# Patient Record
Sex: MALE | Race: WHITE | NOT HISPANIC OR LATINO | Employment: OTHER | URBAN - METROPOLITAN AREA
[De-identification: names, ages, dates, MRNs, and addresses within clinical notes are randomized per-mention and may not be internally consistent; named-entity substitution may affect disease eponyms.]

---

## 2017-01-23 RX ORDER — DILTIAZEM HYDROCHLORIDE 60 MG/1
TABLET, FILM COATED ORAL
Qty: 10.2 G | Refills: 2 | Status: SHIPPED | OUTPATIENT
Start: 2017-01-23 | End: 2017-08-01 | Stop reason: SDUPTHER

## 2017-01-25 ENCOUNTER — TELEPHONE (OUTPATIENT)
Dept: INTERNAL MEDICINE | Facility: CLINIC | Age: 37
End: 2017-01-25

## 2017-01-25 NOTE — TELEPHONE ENCOUNTER
----- Message from Bonnie Pierce sent at 1/25/2017  9:48 AM EST -----  Contact: Patient  Patient was prescribed Symbicort last year, and our office gave him a coupon for this med.  Do we have any more coupons?  Please advise.     Patient:  [phone]

## 2017-01-25 NOTE — TELEPHONE ENCOUNTER
Will you check and let patient know what coupons we have? Also, he can always check online as well ([email or web address]). Thanks.

## 2017-02-22 RX ORDER — MONTELUKAST SODIUM 10 MG/1
TABLET ORAL
Qty: 30 TABLET | Refills: 11 | Status: SHIPPED | OUTPATIENT
Start: 2017-02-22 | End: 2018-06-21 | Stop reason: SDUPTHER

## 2017-03-06 ENCOUNTER — OFFICE VISIT (OUTPATIENT)
Dept: INTERNAL MEDICINE | Facility: CLINIC | Age: 37
End: 2017-03-06

## 2017-03-06 VITALS
DIASTOLIC BLOOD PRESSURE: 70 MMHG | BODY MASS INDEX: 23.99 KG/M2 | WEIGHT: 181 LBS | HEIGHT: 73 IN | HEART RATE: 66 BPM | SYSTOLIC BLOOD PRESSURE: 108 MMHG

## 2017-03-06 DIAGNOSIS — J45.909 UNCOMPLICATED ASTHMA, UNSPECIFIED ASTHMA SEVERITY: ICD-10-CM

## 2017-03-06 DIAGNOSIS — E78.00 HYPERCHOLESTEREMIA: Primary | ICD-10-CM

## 2017-03-06 LAB
ALBUMIN SERPL-MCNC: 4.5 G/DL (ref 3.5–5.2)
ALBUMIN/GLOB SERPL: 1.8 G/DL
ALP SERPL-CCNC: 42 U/L (ref 39–117)
ALT SERPL-CCNC: 25 U/L (ref 1–41)
AST SERPL-CCNC: 22 U/L (ref 1–40)
BILIRUB SERPL-MCNC: 0.3 MG/DL (ref 0.1–1.2)
BUN SERPL-MCNC: 14 MG/DL (ref 6–20)
BUN/CREAT SERPL: 16.7 (ref 7–25)
CALCIUM SERPL-MCNC: 9.5 MG/DL (ref 8.6–10.5)
CHLORIDE SERPL-SCNC: 100 MMOL/L (ref 98–107)
CHOLEST SERPL-MCNC: 221 MG/DL (ref 0–200)
CO2 SERPL-SCNC: 27 MMOL/L (ref 22–29)
CREAT SERPL-MCNC: 0.84 MG/DL (ref 0.76–1.27)
GLOBULIN SER CALC-MCNC: 2.5 GM/DL
GLUCOSE SERPL-MCNC: 100 MG/DL (ref 65–99)
HDLC SERPL-MCNC: 52 MG/DL (ref 40–60)
LDLC SERPL CALC-MCNC: 144 MG/DL (ref 0–100)
POTASSIUM SERPL-SCNC: 4.3 MMOL/L (ref 3.5–5.2)
PROT SERPL-MCNC: 7 G/DL (ref 6–8.5)
SODIUM SERPL-SCNC: 142 MMOL/L (ref 136–145)
TRIGL SERPL-MCNC: 123 MG/DL (ref 0–150)
VLDLC SERPL-MCNC: 24.6 MG/DL (ref 5–40)

## 2017-03-06 PROCEDURE — 99214 OFFICE O/P EST MOD 30 MIN: CPT | Performed by: NURSE PRACTITIONER

## 2017-03-07 NOTE — PROGRESS NOTES
Subjective   Rush Ruiz is a 36 y.o. male who presents for f/u regarding hyperlipidemia and asthma.    HPI Comments: He has been resistant to the beginning statins despite strong family hx CAD, Dad MI at age 38.  He has a history of asthma which has been well-controlled on Symbicort which is does not use daily. He rarely uses his Albuterol inhaler.     Hyperlipidemia   This is a chronic problem. The current episode started more than 1 year ago. The problem is uncontrolled. Recent lipid tests were reviewed and are high. He has no history of diabetes. Pertinent negatives include no chest pain, leg pain, myalgias or shortness of breath. Current antihyperlipidemic treatment includes herbal therapy (started Cholestoff in December 2016).        The following portions of the patient's history were reviewed and updated as appropriate: allergies, current medications, past social history and problem list.    Past Medical History   Diagnosis Date   • Allergic rhinitis    • Anxiety    • Asthma    • Atresia of external auditory canal 1978     Left Ear   • Hepatitis C antibody test positive    • History of retinopathy    • Hyperlipidemia    • Psoriasis      Dr. Milton Pineda         Current Outpatient Prescriptions:   •  albuterol (PROVENTIL HFA;VENTOLIN HFA) 108 (90 BASE) MCG/ACT inhaler, Inhale 2 puffs every 4 (four) hours as needed for wheezing., Disp: , Rfl:   •  loratadine (CLARITIN) 10 MG tablet, Take 10 mg by mouth daily., Disp: , Rfl:   •  montelukast (SINGULAIR) 10 MG tablet, TAKE ONE TABLET BY MOUTH DAILY, Disp: 30 tablet, Rfl: 11  •  SYMBICORT 80-4.5 MCG/ACT inhaler, INHALE TWO PUFFS BY MOUTH TWICE A DAY. RINSE AND SPIT AFTER USE., Disp: 10.2 g, Rfl: 2    No Known Allergies    Review of Systems   Constitutional: Negative for chills, fatigue, fever and unexpected weight change.   HENT: Negative for congestion, ear pain, postnasal drip, sinus pressure, sore throat and trouble swallowing.    Eyes: Negative for  "visual disturbance.   Respiratory: Negative for cough, chest tightness, shortness of breath and wheezing.    Cardiovascular: Negative for chest pain, palpitations and leg swelling.   Gastrointestinal: Negative for abdominal pain, blood in stool, nausea and vomiting.   Endocrine: Negative for cold intolerance and heat intolerance.   Genitourinary: Negative for dysuria, frequency and urgency.   Musculoskeletal: Negative for arthralgias, joint swelling and myalgias.   Skin: Negative for color change.   Allergic/Immunologic: Negative for immunocompromised state.   Neurological: Negative for syncope, weakness and headaches.   Hematological: Does not bruise/bleed easily.       Objective   Vitals:    03/06/17 0813   BP: 108/70   Pulse: 66   Weight: 181 lb (82.1 kg)   Height: 73\" (185.4 cm)     Physical Exam   Constitutional: He is oriented to person, place, and time. He appears well-developed and well-nourished. No distress.   HENT:   Head: Normocephalic and atraumatic.   Right Ear: External ear normal.   Eyes: Conjunctivae and EOM are normal. Pupils are equal, round, and reactive to light. No scleral icterus.   Neck: Normal range of motion. Neck supple. No thyromegaly present.   Cardiovascular: Normal rate, regular rhythm, normal heart sounds and intact distal pulses.  Exam reveals no gallop and no friction rub.    No murmur heard.  Pulmonary/Chest: Effort normal and breath sounds normal. No respiratory distress.   Lymphadenopathy:     He has no cervical adenopathy.   Neurological: He is alert and oriented to person, place, and time.   Skin: Skin is warm and dry. No rash noted. No erythema.   Psychiatric: He has a normal mood and affect. His behavior is normal.   Nursing note and vitals reviewed.      Assessment/Plan   Rush was seen today for follow-up.    Diagnoses and all orders for this visit:    Hypercholesteremia  Comments:  recheck lipids today, would like to take Red Yeast Rice if still elevated instead of statin " therapy  Orders:  -     Lipid Panel; Future  -     Comprehensive Metabolic Panel; Future  -     Lipid Panel  -     Comprehensive Metabolic Panel    Uncomplicated asthma, unspecified asthma severity  Comments:  doing well with current meds

## 2017-04-03 ENCOUNTER — TELEPHONE (OUTPATIENT)
Dept: INTERNAL MEDICINE | Facility: CLINIC | Age: 37
End: 2017-04-03

## 2017-04-03 NOTE — TELEPHONE ENCOUNTER
----- Message from Whitney Valdez MA sent at 4/3/2017 12:29 PM EDT -----  Pt calling for referal to Urology for possible Vasectomy    Pt#[phone]

## 2017-04-04 DIAGNOSIS — Z30.09 ENCOUNTER FOR EVALUATION REGARDING CONTRACEPTION OPTIONS: Primary | ICD-10-CM

## 2017-04-14 ENCOUNTER — TELEPHONE (OUTPATIENT)
Dept: INTERNAL MEDICINE | Facility: CLINIC | Age: 37
End: 2017-04-14

## 2017-04-14 NOTE — TELEPHONE ENCOUNTER
----- Message from Brii Esqueda sent at 4/14/2017 11:49 AM EDT -----  Contact: pt - Dr Camarillo' pt - RE: new Rx refill  Pt calling and would like a refill on a Rx for Xanax. Pt informs has to fly out of town and would like Xanax to calm nerves on plane. Pt informs was given Rx for same reason about 1 1/2 yrs ago. Could you please call Rx to pt's pharmacy? Please advise. Thanks      [facility] [address] AT [address] [place] [address] - [phone]  - [phone] (Phone)  [phone] (Fax)    Pt # [phone]

## 2017-05-24 ENCOUNTER — TELEPHONE (OUTPATIENT)
Dept: INTERNAL MEDICINE | Facility: CLINIC | Age: 37
End: 2017-05-24

## 2017-06-01 ENCOUNTER — TELEPHONE (OUTPATIENT)
Dept: INTERNAL MEDICINE | Facility: CLINIC | Age: 37
End: 2017-06-01

## 2017-06-01 NOTE — TELEPHONE ENCOUNTER
----- Message from Bonnie Pierce sent at 6/1/2017 10:30 AM EDT -----  Contact: Patient  Patient called stating he missed a call from a few days ago.  Please return his call when you can.    Patient:  [phone]    Call before 11 AM today or tomorrow please.  Thanks.

## 2017-06-02 ENCOUNTER — TELEPHONE (OUTPATIENT)
Dept: INTERNAL MEDICINE | Facility: CLINIC | Age: 37
End: 2017-06-02

## 2017-06-02 NOTE — TELEPHONE ENCOUNTER
Transferred call from     Pt calling stating he would like a C reactive protein and lipo profile due to having family HX of heart disease. He spoke with his insurance and they stated to use code of Z82.49, which is family hx of cardiovascular disease. He does need appeal letter stating he should have test done or not, if you feel strongly about him having this done. Please advise thanks

## 2017-06-29 NOTE — TELEPHONE ENCOUNTER
Pt calling back asking your advise as if he can get the C reactive protein and lipo labs done, please advise thanks.

## 2017-07-11 ENCOUNTER — OFFICE VISIT (OUTPATIENT)
Dept: INTERNAL MEDICINE | Facility: CLINIC | Age: 37
End: 2017-07-11

## 2017-07-11 VITALS
OXYGEN SATURATION: 98 % | DIASTOLIC BLOOD PRESSURE: 70 MMHG | HEART RATE: 50 BPM | TEMPERATURE: 98.3 F | SYSTOLIC BLOOD PRESSURE: 104 MMHG | BODY MASS INDEX: 23.09 KG/M2 | WEIGHT: 175 LBS

## 2017-07-11 DIAGNOSIS — J02.9 ACUTE PHARYNGITIS, UNSPECIFIED ETIOLOGY: Primary | ICD-10-CM

## 2017-07-11 LAB
EXPIRATION DATE: NORMAL
INTERNAL CONTROL: NORMAL
Lab: NORMAL
S PYO AG THROAT QL: NEGATIVE

## 2017-07-11 PROCEDURE — 87880 STREP A ASSAY W/OPTIC: CPT | Performed by: NURSE PRACTITIONER

## 2017-07-11 PROCEDURE — 99213 OFFICE O/P EST LOW 20 MIN: CPT | Performed by: NURSE PRACTITIONER

## 2017-07-11 RX ORDER — CETIRIZINE HYDROCHLORIDE 10 MG/1
10 TABLET ORAL DAILY
Qty: 7 TABLET | Refills: 0
Start: 2017-07-11 | End: 2017-07-18

## 2017-07-11 RX ORDER — GUAIFENESIN 600 MG/1
600 TABLET, EXTENDED RELEASE ORAL 2 TIMES DAILY
Qty: 14 TABLET | Refills: 0
Start: 2017-07-11 | End: 2017-07-18

## 2017-07-11 NOTE — PROGRESS NOTES
Subjective   Rush Ruiz is a 36 y.o. male.     Sore Throat    This is a new problem. The current episode started 1 to 4 weeks ago. The problem has been gradually worsening. The pain is worse on the left side. There has been no fever. The pain is at a severity of 2/10 (dull, worst 6/10). The pain is mild. Associated symptoms include coughing (dry ), a hoarse voice and swollen glands ( about 1 weeks ago but resolved ). Pertinent negatives include no abdominal pain, congestion, diarrhea, ear discharge, ear pain, headaches, plugged ear sensation, neck pain, shortness of breath, trouble swallowing or vomiting. He has had no exposure to strep or mono. Treatments tried: throat lozenges  The treatment provided mild relief.        The following portions of the patient's history were reviewed and updated as appropriate: allergies, current medications, past family history and problem list.    Review of Systems   Constitutional: Negative for appetite change, chills, fatigue and fever.   HENT: Positive for hoarse voice, postnasal drip, sore throat and voice change. Negative for congestion, ear discharge, ear pain, facial swelling, hearing loss, rhinorrhea, sinus pressure, sneezing, tinnitus and trouble swallowing.    Respiratory: Positive for cough (dry ). Negative for chest tightness, shortness of breath and wheezing.    Cardiovascular: Negative for chest pain.   Gastrointestinal: Negative for abdominal pain, diarrhea, nausea and vomiting.        He denies any reflux   Musculoskeletal: Negative for neck pain and neck stiffness.   Neurological: Negative for headaches.   Hematological: Negative for adenopathy.       Objective   Physical Exam   Constitutional: He appears well-developed and well-nourished. He is cooperative. He does not have a sickly appearance. He does not appear ill.   HENT:   Head: Normocephalic.   Right Ear: Hearing, tympanic membrane and external ear normal. No drainage, swelling or tenderness. No mastoid  tenderness. Tympanic membrane is not injected, not scarred, not erythematous and not bulging. Tympanic membrane mobility is normal. No middle ear effusion. No decreased hearing is noted.   Left Ear: Hearing normal.   Nose: Nose normal. No mucosal edema, rhinorrhea, sinus tenderness or nasal deformity. Right sinus exhibits no maxillary sinus tenderness and no frontal sinus tenderness. Left sinus exhibits no maxillary sinus tenderness and no frontal sinus tenderness.   Mouth/Throat: Mucous membranes are normal. Normal dentition. Posterior oropharyngeal erythema present. Tonsils are 3+ on the right. Tonsils are 3+ on the left. No tonsillar exudate.   No TM to left ear    Eyes: Conjunctivae and lids are normal. Pupils are equal, round, and reactive to light. Right eye exhibits no discharge and no exudate. Left eye exhibits no discharge and no exudate.   Neck: Trachea normal and normal range of motion. No edema present. No thyroid mass and no thyromegaly present.   Cardiovascular: Regular rhythm, normal heart sounds and normal pulses.    No murmur heard.  Pulmonary/Chest: Breath sounds normal. No respiratory distress. He has no decreased breath sounds. He has no wheezes. He has no rhonchi. He has no rales.   Lymphadenopathy:        Head (right side): No submental, no submandibular, no tonsillar, no preauricular, no posterior auricular and no occipital adenopathy present.        Head (left side): No submental, no submandibular, no tonsillar, no preauricular, no posterior auricular and no occipital adenopathy present.     He has no cervical adenopathy.   Neurological: He is alert.   Skin: Skin is warm, dry and intact. No cyanosis. Nails show no clubbing.       Assessment/Plan   Rush was seen today for sore throat.    Diagnoses and all orders for this visit:    Acute pharyngitis, unspecified etiology  Comments:  warm salt gargles; switch to allegra/zyrtec   Orders:  -     POC Rapid Strep A  -     guaiFENesin (MUCINEX) 600  MG 12 hr tablet; Take 1 tablet by mouth 2 (Two) Times a Day for 7 days.  -     cetirizine (zyrTEC) 10 MG tablet; Take 1 tablet by mouth Daily for 7 days.      Rapid strep negative.

## 2017-07-11 NOTE — PATIENT INSTRUCTIONS

## 2017-07-26 ENCOUNTER — TELEPHONE (OUTPATIENT)
Dept: INTERNAL MEDICINE | Facility: CLINIC | Age: 37
End: 2017-07-26

## 2017-08-01 ENCOUNTER — TELEPHONE (OUTPATIENT)
Dept: INTERNAL MEDICINE | Facility: CLINIC | Age: 37
End: 2017-08-01

## 2017-08-01 RX ORDER — DILTIAZEM HYDROCHLORIDE 60 MG/1
TABLET, FILM COATED ORAL
Qty: 10.2 G | Refills: 1 | Status: SHIPPED | OUTPATIENT
Start: 2017-08-01 | End: 2018-03-12 | Stop reason: SDUPTHER

## 2017-08-01 RX ORDER — ALBUTEROL SULFATE 90 UG/1
2 AEROSOL, METERED RESPIRATORY (INHALATION) EVERY 4 HOURS PRN
Qty: 1 INHALER | Refills: 3 | Status: SHIPPED | OUTPATIENT
Start: 2017-08-01

## 2017-08-01 NOTE — TELEPHONE ENCOUNTER
Pt calling back stating he needs to have testing done because of family history of cardiovascular disease. Does pt need to come in and schedule appt with you to be evaluated to have this testing done, please advise  Thanks.

## 2017-08-01 NOTE — TELEPHONE ENCOUNTER
----- Message from Vero Ferris sent at 8/1/2017  3:45 PM EDT -----  Contact: pt  Pt calling and would like a refill on RX sent into Pharmacy. Please advise.         albuterol (PROVENTIL HFA;VENTOLIN HFA) 108 (90 BASE) MCG/ACT inhaler       Send to :  [facility]    Pt# [phone]

## 2017-08-02 NOTE — TELEPHONE ENCOUNTER
Called pt and pt states that he has not mailed letter yet but he was concerned about them needing family history of information. He will send them letter you addressed and let us know if he needs anything else, please advise thanks.

## 2017-08-24 ENCOUNTER — TELEPHONE (OUTPATIENT)
Dept: INTERNAL MEDICINE | Facility: CLINIC | Age: 37
End: 2017-08-24

## 2017-08-24 DIAGNOSIS — R09.89 GLOBUS SENSATION: Primary | ICD-10-CM

## 2017-08-24 NOTE — TELEPHONE ENCOUNTER
----- Message from Brii Esqueda sent at 8/24/2017  8:33 AM EDT -----  Contact: pt - Dr Camarillo' pt - RE: referral  Pt calling and would like a return call regarding a referral for an ENT. Pt informs is having issues of sensation of something stick in throat. Pt was seen in office for throat flaring up and tested for strep. Could you please put referral in pt's chart. Please advise. Thanks      Pt # [phone]

## 2017-09-13 ENCOUNTER — OFFICE VISIT (OUTPATIENT)
Dept: INTERNAL MEDICINE | Facility: CLINIC | Age: 37
End: 2017-09-13

## 2017-09-13 VITALS
WEIGHT: 175 LBS | BODY MASS INDEX: 23.19 KG/M2 | DIASTOLIC BLOOD PRESSURE: 78 MMHG | HEIGHT: 73 IN | SYSTOLIC BLOOD PRESSURE: 112 MMHG

## 2017-09-13 DIAGNOSIS — J45.20 MILD INTERMITTENT ASTHMA WITHOUT COMPLICATION: ICD-10-CM

## 2017-09-13 DIAGNOSIS — E78.00 HYPERCHOLESTEREMIA: Primary | ICD-10-CM

## 2017-09-13 LAB
CHOLEST SERPL-MCNC: 219 MG/DL (ref 0–200)
HDLC SERPL-MCNC: 51 MG/DL (ref 40–60)
LDLC SERPL CALC-MCNC: 150 MG/DL (ref 0–100)
LDLC/HDLC SERPL: 2.94 {RATIO}
TRIGL SERPL-MCNC: 91 MG/DL (ref 0–150)
VLDLC SERPL-MCNC: 18.2 MG/DL (ref 5–40)

## 2017-09-13 PROCEDURE — 99212 OFFICE O/P EST SF 10 MIN: CPT

## 2017-09-13 PROCEDURE — 80061 LIPID PANEL: CPT

## 2017-09-13 NOTE — PROGRESS NOTES
Subjective   Rush Ruiz is a 36 y.o. male.     Hyperlipidemia   This is a chronic problem. The current episode started more than 1 year ago. The problem is uncontrolled. Recent lipid tests were reviewed and are high. He has no history of chronic renal disease or diabetes. There are no known factors aggravating his hyperlipidemia. Associated symptoms include shortness of breath (He has known asthma and uses Symbicort gen daily.). Pertinent negatives include no chest pain, focal sensory loss, focal weakness or leg pain. He is currently on no antihyperlipidemic treatment. There are no compliance problems.  Risk factors for coronary artery disease include male sex, family history and dyslipidemia.        The following portions of the patient's history were reviewed and updated as appropriate: allergies, current medications, past family history, past medical history, past social history, past surgical history and problem list.    Review of Systems   Constitutional: Negative for chills, fatigue, fever and unexpected weight change.   HENT: Negative for congestion, ear pain, postnasal drip, sinus pressure, sore throat and trouble swallowing.    Eyes: Negative for visual disturbance.   Respiratory: Positive for shortness of breath (He has known asthma and uses Symbicort gen daily.). Negative for cough, chest tightness and wheezing.    Cardiovascular: Negative for chest pain, palpitations and leg swelling.   Gastrointestinal: Negative for abdominal pain, blood in stool, nausea and vomiting.   Endocrine: Negative for cold intolerance and heat intolerance.   Genitourinary: Negative for dysuria, frequency and urgency.   Musculoskeletal: Negative for arthralgias and joint swelling.   Skin: Negative for color change.   Allergic/Immunologic: Negative for immunocompromised state.   Neurological: Negative for focal weakness, syncope, weakness and headaches.   Hematological: Does not bruise/bleed easily.       Objective   Physical  Exam   Constitutional: He is oriented to person, place, and time. He appears well-developed and well-nourished. No distress.   HENT:   Head: Normocephalic and atraumatic.   Eyes: Conjunctivae are normal.   Neck: Normal range of motion. Neck supple.   Cardiovascular: Normal rate, regular rhythm, normal heart sounds and intact distal pulses.  Exam reveals no gallop and no friction rub.    No murmur heard.  Pulmonary/Chest: Effort normal and breath sounds normal. No respiratory distress.   Lymphadenopathy:     He has no cervical adenopathy.   Neurological: He is alert and oriented to person, place, and time.   Skin: Skin is warm and dry. No rash noted. No erythema.   Psychiatric: He has a normal mood and affect. His behavior is normal.   Nursing note and vitals reviewed.      Assessment/Plan   Rush was seen today for hyperlipidemia and asthma.    Diagnoses and all orders for this visit:    Hypercholesteremia  -     Lipid Panel; Future    Mild intermittent asthma without complication    Patient is to continue on all meds, diet and activity.

## 2017-09-26 ENCOUNTER — TELEPHONE (OUTPATIENT)
Dept: INTERNAL MEDICINE | Facility: CLINIC | Age: 37
End: 2017-09-26

## 2017-09-26 NOTE — TELEPHONE ENCOUNTER
----- Message from Whitney Valdez MA sent at 9/26/2017  1:26 PM EDT -----  Pt calling and says his deductible has been met. Pt asks if his chart can be reviewed for any recommendations for screenings, immunizations etc..      Pt [phone]

## 2017-09-27 ENCOUNTER — TELEPHONE (OUTPATIENT)
Dept: INTERNAL MEDICINE | Facility: CLINIC | Age: 37
End: 2017-09-27

## 2017-09-27 DIAGNOSIS — Z13.6 SCREENING FOR CARDIOVASCULAR CONDITION: Primary | ICD-10-CM

## 2017-09-27 NOTE — TELEPHONE ENCOUNTER
----- Message from Kristin Jones sent at 9/27/2017 10:06 AM EDT -----  Pt would like referral to Cardiologist he has family hx of cardiovascular disease and has met his insurance deductible.  Kristin has suggested some testing but insurance will not approve. His father had his first heart attack at his age and he would like to see a cardiologist.  Please advise    Pt # [phone]

## 2017-10-10 ENCOUNTER — OFFICE VISIT (OUTPATIENT)
Dept: INTERNAL MEDICINE | Facility: CLINIC | Age: 37
End: 2017-10-10

## 2017-10-10 VITALS
WEIGHT: 179 LBS | DIASTOLIC BLOOD PRESSURE: 72 MMHG | HEART RATE: 68 BPM | BODY MASS INDEX: 23.72 KG/M2 | HEIGHT: 73 IN | OXYGEN SATURATION: 97 % | SYSTOLIC BLOOD PRESSURE: 120 MMHG

## 2017-10-10 DIAGNOSIS — J45.20 MILD INTERMITTENT ASTHMA WITHOUT COMPLICATION: Primary | ICD-10-CM

## 2017-10-10 DIAGNOSIS — E78.00 HYPERCHOLESTEREMIA: ICD-10-CM

## 2017-10-10 PROCEDURE — 99213 OFFICE O/P EST LOW 20 MIN: CPT | Performed by: NURSE PRACTITIONER

## 2017-10-11 NOTE — PROGRESS NOTES
Subjective   Rush Ruiz is a 37 y.o. male who presents for f/u regarding asthma and hypercholesteremia.    HPI Comments: He does well with Symbicort and Singulair for management of asthma, denies wheezing or chest tightness. Uses ProAir very rarely.  He has made an appt with Dr. Hopson for further evaluation and consult re: family hx heart disease. He has a longstanding hx of elevated cholesterol but has very reluctant to begin statin therapy (has taken Red yeast rice and other OTC supplements without improvement in levels), currently practices a vegetarian diet.  He is scheduled for 5 year repeat colonoscopy with Dr. Ruiz.      Hyperlipidemia   This is a chronic problem. The current episode started more than 1 year ago. The problem is uncontrolled. Recent lipid tests were reviewed and are high. He has no history of diabetes or hypothyroidism. There are no known factors aggravating his hyperlipidemia. Pertinent negatives include no chest pain, focal weakness or myalgias. He is currently on no antihyperlipidemic treatment. The current treatment provides no improvement of lipids.        The following portions of the patient's history were reviewed and updated as appropriate: allergies, current medications, past social history and problem list.    Past Medical History:   Diagnosis Date   • Allergic rhinitis    • Anxiety    • Asthma    • Atresia of external auditory canal 1978    Left Ear   • Hepatitis C antibody test positive    • History of retinopathy    • Hyperlipidemia    • Psoriasis     Dr. Milton Pineda         Current Outpatient Prescriptions:   •  albuterol (PROVENTIL HFA;VENTOLIN HFA) 108 (90 BASE) MCG/ACT inhaler, Inhale 2 puffs Every 4 (Four) Hours As Needed for Wheezing., Disp: 1 inhaler, Rfl: 3  •  montelukast (SINGULAIR) 10 MG tablet, TAKE ONE TABLET BY MOUTH DAILY, Disp: 30 tablet, Rfl: 11  •  SYMBICORT 80-4.5 MCG/ACT inhaler, INHALE TWO PUFFS BY MOUTH TWICE A DAY. RINSE AND SPIT AFTER USE.,  "Disp: 10.2 g, Rfl: 1    No Known Allergies    Review of Systems   Constitutional: Negative for chills, fatigue, fever and unexpected weight change.   HENT: Positive for congestion and postnasal drip. Negative for ear pain, sinus pressure, sore throat and trouble swallowing.    Eyes: Negative for visual disturbance.   Respiratory: Negative for cough, chest tightness and wheezing.    Cardiovascular: Negative for chest pain, palpitations and leg swelling.   Gastrointestinal: Negative for abdominal pain, blood in stool, nausea and vomiting.   Endocrine: Negative for cold intolerance and heat intolerance.   Genitourinary: Negative for dysuria, frequency and urgency.   Musculoskeletal: Negative for arthralgias, joint swelling and myalgias.   Skin: Negative for color change.   Allergic/Immunologic: Negative for immunocompromised state.   Neurological: Negative for focal weakness, syncope, weakness and headaches.   Hematological: Does not bruise/bleed easily.       Objective   Vitals:    10/10/17 0947   BP: 120/72   BP Location: Left arm   Patient Position: Sitting   Cuff Size: Adult   Pulse: 68   SpO2: 97%   Weight: 179 lb (81.2 kg)   Height: 73\" (185.4 cm)     Physical Exam   Constitutional: He is oriented to person, place, and time. He appears well-developed and well-nourished. No distress.   HENT:   Head: Normocephalic and atraumatic.   Right Ear: External ear normal.   Atresia left external ear canal   Eyes: Conjunctivae are normal.   Neck: Normal range of motion. Neck supple.   Cardiovascular: Normal rate, regular rhythm, normal heart sounds and intact distal pulses.  Exam reveals no gallop and no friction rub.    No murmur heard.  Pulmonary/Chest: Effort normal and breath sounds normal. No respiratory distress.   Lymphadenopathy:     He has no cervical adenopathy.   Neurological: He is alert and oriented to person, place, and time.   Skin: Skin is warm and dry. No rash noted. No erythema.   Psychiatric: He has a " normal mood and affect. His behavior is normal.   Nursing note and vitals reviewed.      Assessment/Plan   Rush was seen today for hyperlipidemia.    Diagnoses and all orders for this visit:    Mild intermittent asthma without complication  Comments:  doing well with current meds    Hypercholesteremia  Comments:  reviewed lipid panel from 9/13 w/ pt, he has declined statin therapy

## 2017-10-20 ENCOUNTER — OFFICE VISIT (OUTPATIENT)
Dept: CARDIOLOGY | Facility: CLINIC | Age: 37
End: 2017-10-20

## 2017-10-20 VITALS
BODY MASS INDEX: 24.38 KG/M2 | WEIGHT: 180 LBS | HEART RATE: 40 BPM | SYSTOLIC BLOOD PRESSURE: 118 MMHG | HEIGHT: 72 IN | DIASTOLIC BLOOD PRESSURE: 70 MMHG

## 2017-10-20 DIAGNOSIS — Z82.49 FAMILY HISTORY OF CARDIOVASCULAR DISEASE: ICD-10-CM

## 2017-10-20 DIAGNOSIS — R06.09 DOE (DYSPNEA ON EXERTION): ICD-10-CM

## 2017-10-20 DIAGNOSIS — E78.00 HYPERCHOLESTEREMIA: Primary | ICD-10-CM

## 2017-10-20 PROCEDURE — 93000 ELECTROCARDIOGRAM COMPLETE: CPT | Performed by: INTERNAL MEDICINE

## 2017-10-20 PROCEDURE — 99204 OFFICE O/P NEW MOD 45 MIN: CPT | Performed by: INTERNAL MEDICINE

## 2017-10-20 RX ORDER — LORATADINE 10 MG/1
1 CAPSULE, LIQUID FILLED ORAL DAILY
COMMUNITY
End: 2018-09-19

## 2017-10-20 NOTE — PROGRESS NOTES
Rush Ruiz  1980  Date of Office Visit: 10/20/2017  Encounter Provider: Nick Hopson MD  Place of Service: [facility] CARDIOLOGY      CHIEF COMPLAINT:  Hyperlipidemia  Family history of early coronary artery disease  Asthma    HISTORY OF PRESENT ILLNESS:Dr. Camarillo,     I had the pleasure of seeing your patient in consultation today. As you know, he is a very pleasant 37-year-old gentleman with a medical history of acid reflux, asthma, prior Hep C antibody positive testing, who presented to me secondary to hyperlipidemia and his family history of CAD.  The patient's father had a myocardial infarction at age 38.  He was smoking at the time.  The patient states that he previously has smoked, but has not in 2 years. He exercises multiple times a week and states that he occasionally will have dyspnea on exertion, but has not had any clear cut angina.  He denies any dyspnea at rest.  He has no orthopnea, PND, or lower extremity edema. He has had multiple elevated lipid panel evaluations and statin has been recommended.  He has been taking supplements.  These supplements have included a medication called CholestOff. He started Red Yeast Rice as well. He has had no improvement in his panel on that and currently still has an LDL of 150.           Review of Systems   Constitution: Negative for fever, weakness and malaise/fatigue.   HENT: Negative for nosebleeds and sore throat.    Eyes: Negative for blurred vision and double vision.   Cardiovascular: Negative for chest pain, claudication, palpitations and syncope.   Respiratory: Negative for cough, shortness of breath and snoring.    Endocrine: Negative for cold intolerance, heat intolerance and polydipsia.   Skin: Negative for itching, poor wound healing and rash.   Musculoskeletal: Negative for joint pain, joint swelling, muscle weakness and myalgias.   Gastrointestinal: Negative for abdominal pain, melena, nausea and  "vomiting.   Neurological: Negative for light-headedness, loss of balance, seizures and vertigo.   Psychiatric/Behavioral: Negative for altered mental status and depression.          Past Medical History:   Diagnosis Date   • Allergic rhinitis    • Anxiety    • Asthma     intermittent asthma without complications.   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 Milton Pineda       The following portions of the patient's history were reviewed and updated as appropriate: Social history , Family history and Surgical history     Current Outpatient Prescriptions on File Prior to Visit   Medication Sig Dispense Refill   • albuterol (PROVENTIL HFA;VENTOLIN HFA) 108 (90 BASE) MCG/ACT inhaler Inhale 2 puffs Every 4 (Four) Hours As Needed for Wheezing. 1 inhaler 3   • montelukast (SINGULAIR) 10 MG tablet TAKE ONE TABLET BY MOUTH DAILY 30 tablet 11   • SYMBICORT 80-4.5 MCG/ACT inhaler INHALE TWO PUFFS BY MOUTH TWICE A DAY. RINSE AND SPIT AFTER USE. 10.2 g 1     No current facility-administered medications on file prior to visit.        No Known Allergies    Vitals:    10/20/17 0938   BP: 118/70   Pulse: (!) 40   Weight: 180 lb (81.6 kg)   Height: 72\" (182.9 cm)     Physical Exam   Constitutional: He is oriented to person, place, and time. He appears well-developed and well-nourished.   HENT:   Head: Normocephalic and atraumatic.   Eyes: Conjunctivae and EOM are normal. No scleral icterus.   Neck: Normal range of motion. Neck supple. Normal carotid pulses, no hepatojugular reflux and no JVD present. Carotid bruit is not present. No tracheal deviation present. No thyromegaly present.   Cardiovascular: Regular rhythm.  Bradycardia present.  Exam reveals no gallop and no friction rub.    No murmur heard.  Pulses:       Carotid pulses are 2+ on the right side, and 2+ on the left " side.       Radial pulses are 2+ on the right side, and 2+ on the left side.        Femoral pulses are 2+ on the right side, and 2+ on the left side.       Dorsalis pedis pulses are 2+ on the right side, and 2+ on the left side.        Posterior tibial pulses are 2+ on the right side, and 2+ on the left side.   Pulmonary/Chest: Breath sounds normal. No respiratory distress. He has no decreased breath sounds. He has no wheezes. He has no rhonchi. He has no rales. He exhibits no tenderness.   Abdominal: Soft. Bowel sounds are normal. He exhibits no distension. There is no tenderness. There is no rebound.   Musculoskeletal: He exhibits no edema or deformity.   Neurological: He is alert and oriented to person, place, and time. He has normal strength. No sensory deficit.   Skin: No rash noted. No erythema.   Psychiatric: He has a normal mood and affect. His behavior is normal.       No components found for: CBC  No results found for: CMP  No components found for: LIPID  No results found for: BMP      ECG 12 Lead  Date/Time: 10/20/2017 9:56 AM  Performed by: CARI GALICIA  Authorized by: CARI GALICIA   Comparison: compared with previous ECG from 12/30/2005  Similar to previous ECG  Rhythm: sinus bradycardia  Rate: bradycardic  ST Segments: ST segments normal  T Waves: T waves normal  QRS axis: normal  Clinical impression: non-specific ECG               DISCUSSION/SUMMARYThis is a very pleasant 37-year-old gentleman with a medical history of hyperlipidemia, acid reflux, asthma, and a family history of early myocardial infarction with his father having had a myocardial infarction in his 30s, who presents to me for evaluation.  He denies any recent chest pain.  He will occasionally have dyspnea on exertion.  He also has hyperlipidemia as documented on multiple lipid panels which has been unresponsive to his attempts with alternative therapy including cholestoff and red yeast rice.      1. Early coronary  "artery disease/dyspnea on exertion.  This is mild; however, I will get him set up for a treadmill stress test without imaging.    2. Hyperlipidemia; we have had a long conversation about the risks of coronary artery disease in someone who has a family history of myocardial infarction at age of 38.  The patient has an LDL that is still 150 on therapy and his total cholesterol is greater than 200.  I have encouraged him to consider statin therapy as I think this will decrease his risk of cardiovascular events long-term.  He stated that he will strongly think about this and we have agreed that on our next visit, with his therapies if his lipid panel is not altered, he will \"throw up the white flag\" and start a statin.      I will see him back in six months or earlier with problems.        "

## 2017-12-18 ENCOUNTER — APPOINTMENT (OUTPATIENT)
Dept: WOMENS IMAGING | Facility: HOSPITAL | Age: 37
End: 2017-12-18

## 2017-12-18 ENCOUNTER — OFFICE VISIT (OUTPATIENT)
Dept: INTERNAL MEDICINE | Facility: CLINIC | Age: 37
End: 2017-12-18

## 2017-12-18 VITALS
HEART RATE: 52 BPM | DIASTOLIC BLOOD PRESSURE: 70 MMHG | BODY MASS INDEX: 24.57 KG/M2 | WEIGHT: 185.4 LBS | RESPIRATION RATE: 15 BRPM | SYSTOLIC BLOOD PRESSURE: 110 MMHG | HEIGHT: 73 IN | OXYGEN SATURATION: 97 %

## 2017-12-18 DIAGNOSIS — M25.572 ACUTE LEFT ANKLE PAIN: Primary | ICD-10-CM

## 2017-12-18 DIAGNOSIS — M79.672 LEFT FOOT PAIN: ICD-10-CM

## 2017-12-18 PROCEDURE — 73630 X-RAY EXAM OF FOOT: CPT | Performed by: RADIOLOGY

## 2017-12-18 PROCEDURE — 73610 X-RAY EXAM OF ANKLE: CPT | Performed by: RADIOLOGY

## 2017-12-18 PROCEDURE — 73630 X-RAY EXAM OF FOOT: CPT | Performed by: INTERNAL MEDICINE

## 2017-12-18 PROCEDURE — 73610 X-RAY EXAM OF ANKLE: CPT | Performed by: INTERNAL MEDICINE

## 2017-12-18 PROCEDURE — 99213 OFFICE O/P EST LOW 20 MIN: CPT | Performed by: INTERNAL MEDICINE

## 2017-12-18 NOTE — PROGRESS NOTES
"Subjective   Rush Ruiz is a 37 y.o. male here for   Chief Complaint   Patient presents with   • Foot Pain     Left foot pain   • Ankle Pain   .    Vitals:    12/18/17 0943   BP: 110/70   BP Location: Left arm   Patient Position: Sitting   Cuff Size: Adult   Pulse: 52   Resp: 15   SpO2: 97%   Weight: 84.1 kg (185 lb 6.4 oz)   Height: 185.4 cm (73\")       Body mass index is 24.46 kg/(m^2).    Foot Pain   This is a new problem. The current episode started 1 to 4 weeks ago. The problem occurs constantly. The problem has been waxing and waning. Associated symptoms include arthralgias (left dorsal foot & lateral ankle pain). Pertinent negatives include no chest pain, chills, coughing, fatigue or fever.        The following portions of the patient's history were reviewed and updated as appropriate: allergies, current medications, past social history and problem list.    Review of Systems   Constitutional: Negative for chills, fatigue and fever.   Respiratory: Negative for cough, shortness of breath and wheezing.    Cardiovascular: Negative for chest pain, palpitations and leg swelling.   Musculoskeletal: Positive for arthralgias (left dorsal foot & lateral ankle pain).       Objective   Physical Exam   Constitutional: He appears well-developed and well-nourished. No distress.   Cardiovascular: Normal rate, regular rhythm and normal heart sounds.    Pulmonary/Chest: No respiratory distress. He has no wheezes. He has no rales. He exhibits no tenderness.   Musculoskeletal: Normal range of motion. He exhibits deformity (slight edema left lateral malleolus). He exhibits no edema.   Neurological: He is alert.   Skin: Skin is warm and dry. No rash noted. No erythema.   Psychiatric: He has a normal mood and affect. His behavior is normal.   Nursing note and vitals reviewed.    Walks with slt limp.    Assessment/Plan   Diagnoses and all orders for this visit:    Acute left ankle pain  Comments:  +sprain - xray normal today - " sent for over-view - need air splint to prevent lateral movement - will see ortho if no better soon  Orders:  -     XR Ankle 3+ View Left (In Office)    Left foot pain  -     XR Foot 3+ View Left (In Office)    Other orders  -     Bilberry, Vaccinium myrtillus, 60 MG capsule; Take 280 mg by mouth.  -     Flaxseed, Linseed, (FLAXSEED OIL PO); Take 1 capsule by mouth.  -     MULTIPLE VITAMINS-MINERALS PO; Take 1 capsule by mouth.  -     RED YEAST RICE EXTRACT PO; Take 1,200 mg by mouth.  -     Unable to find; Take 900 mg by mouth.  -     Unable to find; Take 200 mg by mouth.  -     Unable to find; Take 1 capsule by mouth.

## 2017-12-29 ENCOUNTER — ON CAMPUS - OUTPATIENT (OUTPATIENT)
Dept: URBAN - METROPOLITAN AREA HOSPITAL 114 | Facility: HOSPITAL | Age: 37
End: 2017-12-29

## 2017-12-29 ENCOUNTER — HOSPITAL ENCOUNTER (OUTPATIENT)
Facility: HOSPITAL | Age: 37
Setting detail: HOSPITAL OUTPATIENT SURGERY
Discharge: HOME | End: 2017-12-29
Attending: INTERNAL MEDICINE | Admitting: INTERNAL MEDICINE

## 2017-12-29 ENCOUNTER — ANESTHESIA (OUTPATIENT)
Dept: GASTROENTEROLOGY | Facility: HOSPITAL | Age: 37
End: 2017-12-29

## 2017-12-29 ENCOUNTER — ANESTHESIA EVENT (OUTPATIENT)
Dept: GASTROENTEROLOGY | Facility: HOSPITAL | Age: 37
End: 2017-12-29

## 2017-12-29 VITALS
OXYGEN SATURATION: 96 % | RESPIRATION RATE: 20 BRPM | BODY MASS INDEX: 24.16 KG/M2 | WEIGHT: 183.13 LBS | HEART RATE: 50 BPM | DIASTOLIC BLOOD PRESSURE: 66 MMHG | TEMPERATURE: 98.2 F | SYSTOLIC BLOOD PRESSURE: 104 MMHG

## 2017-12-29 DIAGNOSIS — Z12.11 ENCOUNTER FOR SCREENING FOR MALIGNANT NEOPLASM OF COLON: ICD-10-CM

## 2017-12-29 DIAGNOSIS — Z80.0 FAMILY HISTORY OF MALIGNANT NEOPLASM OF DIGESTIVE ORGANS: ICD-10-CM

## 2017-12-29 PROCEDURE — 25010000002 PROPOFOL 10 MG/ML EMULSION: Performed by: ANESTHESIOLOGY

## 2017-12-29 PROCEDURE — 45378 DIAGNOSTIC COLONOSCOPY: CPT | Mod: 33 | Performed by: INTERNAL MEDICINE

## 2017-12-29 RX ORDER — LIDOCAINE HYDROCHLORIDE 20 MG/ML
INJECTION, SOLUTION INFILTRATION; PERINEURAL AS NEEDED
Status: DISCONTINUED | OUTPATIENT
Start: 2017-12-29 | End: 2017-12-29 | Stop reason: SURG

## 2017-12-29 RX ORDER — SODIUM CHLORIDE, SODIUM LACTATE, POTASSIUM CHLORIDE, CALCIUM CHLORIDE 600; 310; 30; 20 MG/100ML; MG/100ML; MG/100ML; MG/100ML
1000 INJECTION, SOLUTION INTRAVENOUS CONTINUOUS PRN
Status: DISCONTINUED | OUTPATIENT
Start: 2017-12-29 | End: 2017-12-29 | Stop reason: HOSPADM

## 2017-12-29 RX ORDER — PROPOFOL 10 MG/ML
VIAL (ML) INTRAVENOUS AS NEEDED
Status: DISCONTINUED | OUTPATIENT
Start: 2017-12-29 | End: 2017-12-29 | Stop reason: SURG

## 2017-12-29 RX ADMIN — SODIUM CHLORIDE, POTASSIUM CHLORIDE, SODIUM LACTATE AND CALCIUM CHLORIDE 1000 ML: 600; 310; 30; 20 INJECTION, SOLUTION INTRAVENOUS at 08:36

## 2017-12-29 RX ADMIN — PROPOFOL 200 MG: 10 INJECTION, EMULSION INTRAVENOUS at 09:35

## 2017-12-29 RX ADMIN — PROPOFOL 200 MG: 10 INJECTION, EMULSION INTRAVENOUS at 09:20

## 2017-12-29 RX ADMIN — LIDOCAINE HYDROCHLORIDE 100 MG: 20 INJECTION, SOLUTION INFILTRATION; PERINEURAL at 09:20

## 2017-12-29 RX ADMIN — PROPOFOL 200 MG: 10 INJECTION, EMULSION INTRAVENOUS at 09:25

## 2017-12-29 NOTE — ANESTHESIA POSTPROCEDURE EVALUATION
Patient: Rush Ruiz    Procedure Summary     Date Anesthesia Start Anesthesia Stop Room / Location    12/29/17 0915 0945  [facility]       Procedure Diagnosis Surgeon Provider    COLONOSCOPY TO CECUM (N/A ) No diagnosis on file. MD Yelena Dailey MD          Anesthesia Type: MAC  Last vitals  BP   104/66 (12/29/17 1007)   Temp   36.8 °C (98.2 °F) (12/29/17 0947)   Pulse   50 (12/29/17 1007)   Resp   20 (12/29/17 1007)     SpO2   96 % (12/29/17 1007)     Post Anesthesia Care and Evaluation    Patient location during evaluation: PACU  Patient participation: complete - patient participated  Level of consciousness: awake  Pain score: 0  Pain management: adequate  Airway patency: patent  Anesthetic complications: No anesthetic complications    Cardiovascular status: acceptable  Respiratory status: acceptable  Hydration status: acceptable    Comments: Blood pressure 104/66, pulse 50, temperature 36.8 °C (98.2 °F), temperature source Oral, resp. rate 20, weight 83.1 kg (183 lb 2 oz), SpO2 96 %.    No anesthesia care post op

## 2017-12-29 NOTE — ANESTHESIA PREPROCEDURE EVALUATION
Anesthesia Evaluation     Patient summary reviewed and Nursing notes reviewed   NPO Solid Status: > 8 hours  NPO Liquid Status: > 8 hours     Airway   Mallampati: I  TM distance: >3 FB  Neck ROM: full  no difficulty expected  Dental - normal exam     Pulmonary - normal exam   (+) a smoker Former, asthma, shortness of breath,   Cardiovascular - normal exam    ECG reviewed    (+) hyperlipidemia      Neuro/Psych  GI/Hepatic/Renal/Endo      Musculoskeletal     Abdominal  - normal exam    Bowel sounds: normal.   Substance History      OB/GYN          Other                                              Anesthesia Plan    ASA 3     MAC     Anesthetic plan and risks discussed with patient.

## 2018-03-12 RX ORDER — DILTIAZEM HYDROCHLORIDE 60 MG/1
TABLET, FILM COATED ORAL
Qty: 10.2 G | Refills: 3 | Status: SHIPPED | OUTPATIENT
Start: 2018-03-12 | End: 2018-03-15 | Stop reason: SDUPTHER

## 2018-03-14 ENCOUNTER — OFFICE VISIT (OUTPATIENT)
Dept: INTERNAL MEDICINE | Facility: CLINIC | Age: 38
End: 2018-03-14

## 2018-03-14 VITALS
WEIGHT: 181 LBS | HEIGHT: 72 IN | DIASTOLIC BLOOD PRESSURE: 78 MMHG | BODY MASS INDEX: 24.52 KG/M2 | SYSTOLIC BLOOD PRESSURE: 110 MMHG

## 2018-03-14 DIAGNOSIS — Z80.0 FAMILY HX OF COLON CANCER: ICD-10-CM

## 2018-03-14 DIAGNOSIS — F41.9 ANXIETY: Primary | ICD-10-CM

## 2018-03-14 DIAGNOSIS — J45.20 MILD INTERMITTENT ASTHMA WITHOUT COMPLICATION: ICD-10-CM

## 2018-03-14 DIAGNOSIS — E78.00 HYPERCHOLESTEREMIA: Primary | ICD-10-CM

## 2018-03-14 LAB
ALBUMIN SERPL-MCNC: 4.4 G/DL (ref 3.5–5.2)
ALBUMIN/GLOB SERPL: 1.6 G/DL
ALP SERPL-CCNC: 41 U/L (ref 39–117)
ALT SERPL W P-5'-P-CCNC: 39 U/L (ref 1–41)
ANION GAP SERPL CALCULATED.3IONS-SCNC: 11.8 MMOL/L
AST SERPL-CCNC: 30 U/L (ref 1–40)
BASOPHILS # BLD AUTO: 0.03 10*3/MM3 (ref 0–0.2)
BASOPHILS NFR BLD AUTO: 0.6 % (ref 0–1.5)
BILIRUB SERPL-MCNC: 0.3 MG/DL (ref 0.1–1.2)
BUN BLD-MCNC: 12 MG/DL (ref 6–20)
BUN/CREAT SERPL: 15.6 (ref 7–25)
CALCIUM SPEC-SCNC: 9.6 MG/DL (ref 8.6–10.5)
CHLORIDE SERPL-SCNC: 101 MMOL/L (ref 98–107)
CHOLEST SERPL-MCNC: 243 MG/DL (ref 0–200)
CO2 SERPL-SCNC: 28.2 MMOL/L (ref 22–29)
CREAT BLD-MCNC: 0.77 MG/DL (ref 0.76–1.27)
EOSINOPHIL # BLD AUTO: 0.1 10*3/MM3 (ref 0–0.7)
EOSINOPHIL # BLD AUTO: 1.9 % (ref 0.3–6.2)
ERYTHROCYTE [DISTWIDTH] IN BLOOD BY AUTOMATED COUNT: 12.7 % (ref 11.5–14.5)
GFR SERPL CREATININE-BSD FRML MDRD: 112 ML/MIN/1.73
GLOBULIN UR ELPH-MCNC: 2.8 GM/DL
GLUCOSE BLD-MCNC: 99 MG/DL (ref 65–99)
HCT VFR BLD AUTO: 44.5 % (ref 40.4–52.2)
HDLC SERPL-MCNC: 53 MG/DL (ref 40–60)
HGB BLD-MCNC: 14.8 G/DL (ref 13.7–17.6)
IMM GRANULOCYTES # BLD: 0 10*3/MM3 (ref 0–0.03)
IMM GRANULOCYTES NFR BLD: 0 % (ref 0–0.5)
LDLC SERPL CALC-MCNC: 175 MG/DL (ref 0–100)
LDLC/HDLC SERPL: 3.29 {RATIO}
LYMPHOCYTES # BLD AUTO: 1.95 10*3/MM3 (ref 0.9–4.8)
LYMPHOCYTES NFR BLD AUTO: 37.9 % (ref 19.6–45.3)
MCH RBC QN AUTO: 30.9 PG (ref 27–32.7)
MCHC RBC AUTO-ENTMCNC: 33.3 G/DL (ref 32.6–36.4)
MCV RBC AUTO: 92.9 FL (ref 79.8–96.2)
MONOCYTES # BLD AUTO: 0.33 10*3/MM3 (ref 0.2–1.2)
MONOCYTES NFR BLD AUTO: 6.4 % (ref 5–12)
NEUTROPHILS # BLD AUTO: 2.74 10*3/MM3 (ref 1.9–8.1)
NEUTROPHILS NFR BLD AUTO: 53.2 % (ref 42.7–76)
PLATELET # BLD AUTO: 211 10*3/MM3 (ref 140–500)
POTASSIUM BLD-SCNC: 4.6 MMOL/L (ref 3.5–5.2)
PROT SERPL-MCNC: 7.2 G/DL (ref 6–8.5)
RBC # BLD AUTO: 4.79 10*6/MM3 (ref 4.6–6)
SODIUM BLD-SCNC: 141 MMOL/L (ref 136–145)
TRIGL SERPL-MCNC: 77 MG/DL (ref 0–150)
VLDLC SERPL-MCNC: 15.4 MG/DL (ref 5–40)
WBC # BLD AUTO: 5.15 10*3/MM3 (ref 4.5–10.7)

## 2018-03-14 PROCEDURE — 80061 LIPID PANEL: CPT | Performed by: NURSE PRACTITIONER

## 2018-03-14 PROCEDURE — 80053 COMPREHEN METABOLIC PANEL: CPT | Performed by: NURSE PRACTITIONER

## 2018-03-14 PROCEDURE — 99214 OFFICE O/P EST MOD 30 MIN: CPT | Performed by: NURSE PRACTITIONER

## 2018-03-14 RX ORDER — ALPRAZOLAM 0.25 MG/1
0.25 TABLET ORAL 3 TIMES DAILY PRN
Qty: 15 TABLET | Refills: 0 | Status: SHIPPED | OUTPATIENT
Start: 2018-03-14 | End: 2019-04-16 | Stop reason: SDUPTHER

## 2018-03-14 NOTE — TELEPHONE ENCOUNTER
Pt was seen today and forgot to ask you for a refill of xanax for flying, he stated  would give this for anxiety of flying, please advise thanks.

## 2018-03-15 RX ORDER — BUDESONIDE AND FORMOTEROL FUMARATE DIHYDRATE 80; 4.5 UG/1; UG/1
2 AEROSOL RESPIRATORY (INHALATION)
Qty: 10.2 G | Refills: 3
Start: 2018-03-15 | End: 2020-07-07 | Stop reason: SDUPTHER

## 2018-03-15 NOTE — PROGRESS NOTES
Subjective   Rush Ruiz is a 37 y.o. male who presents for f/u regarding asthma and hypercholesteremia.    He saw Dr. Hopson last Fall who recommended statin therapy for hyperlipidemia. However, he has been resistant to this. He has increased his exercise since his last visit, still following a vegetarian diet.  He has a longstanding history of asthma which has been well-controlled with Symbicort 2 inh daily (started taking daily several months ago). He leads a very active lifestyle, denies chest tightness or dyspnea.        Hyperlipidemia   This is a chronic problem. The current episode started more than 1 year ago. The problem is uncontrolled. Recent lipid tests were reviewed and are high. He has no history of diabetes or hypothyroidism. There are no known factors aggravating his hyperlipidemia. Pertinent negatives include no chest pain, focal weakness or myalgias. He is currently on no antihyperlipidemic treatment. The current treatment provides no improvement of lipids.        The following portions of the patient's history were reviewed and updated as appropriate: allergies, current medications, past social history and problem list.    Past Medical History:   Diagnosis Date   • Allergic rhinitis    • Anxiety    • Asthma     intermittent asthma without complications.   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 Milton Pineda         Current Outpatient Prescriptions:   •  albuterol (PROVENTIL HFA;VENTOLIN HFA) 108 (90 BASE) MCG/ACT inhaler, Inhale 2 puffs Every 4 (Four) Hours As Needed for Wheezing., Disp: 1 inhaler, Rfl: 3  •  Bilberry, Vaccinium myrtillus, 60 MG capsule, Take 280 mg by mouth., Disp: , Rfl:   •  budesonide-formoterol (SYMBICORT) 80-4.5 MCG/ACT inhaler, Inhale 2 puffs 2 (Two) Times a Day. Rinse and spit after use, Disp: 10.2 g, Rfl: 3  •   "Flaxseed, Linseed, (FLAXSEED OIL PO), Take 1 capsule by mouth., Disp: , Rfl:   •  Loratadine (CLARITIN) 10 MG capsule, Take 1 tablet by mouth Daily., Disp: , Rfl:   •  montelukast (SINGULAIR) 10 MG tablet, TAKE ONE TABLET BY MOUTH DAILY, Disp: 30 tablet, Rfl: 11  •  MULTIPLE VITAMINS-MINERALS PO, Take 1 capsule by mouth., Disp: , Rfl:   •  RED YEAST RICE EXTRACT PO, Take 1,200 mg by mouth., Disp: , Rfl:   •  Unable to find, Take 900 mg by mouth., Disp: , Rfl:   •  Unable to find, Take 200 mg by mouth., Disp: , Rfl:   •  Unable to find, Take 1 capsule by mouth., Disp: , Rfl:   •  ALPRAZolam (XANAX) 0.25 MG tablet, Take 1 tablet by mouth 3 (Three) Times a Day As Needed for Anxiety., Disp: 15 tablet, Rfl: 0    No Known Allergies    Review of Systems   Constitutional: Negative for chills, fatigue, fever and unexpected weight change.   HENT: Positive for congestion and postnasal drip. Negative for ear pain, sinus pressure, sore throat and trouble swallowing.    Eyes: Negative for visual disturbance.   Respiratory: Negative for cough, chest tightness and wheezing.    Cardiovascular: Negative for chest pain, palpitations and leg swelling.   Gastrointestinal: Negative for abdominal pain, blood in stool, nausea and vomiting.   Endocrine: Negative for cold intolerance and heat intolerance.   Genitourinary: Negative for dysuria, frequency and urgency.   Musculoskeletal: Negative for arthralgias, joint swelling and myalgias.   Skin: Negative for color change.   Allergic/Immunologic: Negative for immunocompromised state.   Neurological: Negative for focal weakness, syncope, weakness and headaches.   Hematological: Does not bruise/bleed easily.       Objective   Vitals:    03/14/18 1041   BP: 110/78   BP Location: Left arm   Patient Position: Sitting   Cuff Size: Adult   Weight: 82.1 kg (181 lb)   Height: 182.9 cm (72\")     Physical Exam   Constitutional: He is oriented to person, place, and time. He appears well-developed and " well-nourished. No distress.   HENT:   Head: Normocephalic and atraumatic.   Right Ear: External ear normal.   Mouth/Throat: Posterior oropharyngeal erythema present.   Atresia left external ear canal   Eyes: Conjunctivae are normal.   Neck: Normal range of motion. Neck supple.   Cardiovascular: Normal rate, regular rhythm, normal heart sounds and intact distal pulses.  Exam reveals no gallop and no friction rub.    No murmur heard.  Pulmonary/Chest: Effort normal. No respiratory distress. He has wheezes (diffuse scattered wheezes).   Lymphadenopathy:     He has no cervical adenopathy.   Neurological: He is alert and oriented to person, place, and time.   Skin: Skin is warm and dry. No rash noted. No erythema.   Psychiatric: He has a normal mood and affect. His behavior is normal.   Nursing note and vitals reviewed.      Assessment/Plan   Rush was seen today for hyperlipidemia.    Diagnoses and all orders for this visit:    Hypercholesteremia  Comments:  taking Cholestoff and has increased activity level since January, recheck lipid panel today  Orders:  -     Comprehensive Metabolic Panel; Future  -     CBC Auto Differential; Future  -     Comprehensive Metabolic Panel  -     CBC Auto Differential  -     Lipid Panel; Future  -     Lipid Panel    Mild intermittent asthma without complication  Comments:  advised to increase Symbicort to 1-2 inh bid due to wheezing heard today  Orders:  -     budesonide-formoterol (SYMBICORT) 80-4.5 MCG/ACT inhaler; Inhale 2 puffs 2 (Two) Times a Day. Rinse and spit after use    Family hx of colon cancer  Comments:  colonoscopy 12/29/17 by Dr. Ruiz, repeat 5 years

## 2018-04-03 ENCOUNTER — TELEPHONE (OUTPATIENT)
Dept: INTERNAL MEDICINE | Facility: CLINIC | Age: 38
End: 2018-04-03

## 2018-04-03 NOTE — TELEPHONE ENCOUNTER
----- Message from Brii Esqueda sent at 4/3/2018 10:05 AM EDT -----  Contact: pt - Dr Camarillo' pt - RE: therapy??  Pt calling and would like a return call regarding statin therapy. Could you please call pt to discuss? Pt would like a return call to discuss. Please advise. Thanks        Pt # [phone]

## 2018-04-03 NOTE — TELEPHONE ENCOUNTER
Called pt back for more info, pt states he would like to start on a statin therapy and he wants to know if he can also take the red yeast rice,beta glucan and a plant based statin. Would this be ok to take if he starts a statin, please advise thanks.

## 2018-04-04 DIAGNOSIS — E78.00 HYPERCHOLESTEREMIA: Primary | ICD-10-CM

## 2018-04-04 RX ORDER — ATORVASTATIN CALCIUM 10 MG/1
10 TABLET, FILM COATED ORAL DAILY
Qty: 90 TABLET | Refills: 1 | Status: SHIPPED | OUTPATIENT
Start: 2018-04-04 | End: 2019-01-04 | Stop reason: SINTOL

## 2018-04-04 NOTE — TELEPHONE ENCOUNTER
Pt informs he would like RX Atorvastatin sent to [facility] on [address], please advise thanks

## 2018-05-31 ENCOUNTER — TELEPHONE (OUTPATIENT)
Dept: CARDIOLOGY | Facility: CLINIC | Age: 38
End: 2018-05-31

## 2018-06-21 RX ORDER — MONTELUKAST SODIUM 10 MG/1
TABLET ORAL
Qty: 30 TABLET | Refills: 10 | Status: SHIPPED | OUTPATIENT
Start: 2018-06-21 | End: 2019-04-19 | Stop reason: SDUPTHER

## 2018-08-22 ENCOUNTER — TELEPHONE (OUTPATIENT)
Dept: INTERNAL MEDICINE | Facility: CLINIC | Age: 38
End: 2018-08-22

## 2018-08-22 NOTE — TELEPHONE ENCOUNTER
----- Message from Stephanie Prince sent at 8/22/2018 10:20 AM EDT -----  Contact: Patient  Patient calling, would like to requesting general blood work, STD, and HIV. Please call patient to discuss. Please advise    Patient:[phone]

## 2018-08-28 ENCOUNTER — TELEPHONE (OUTPATIENT)
Dept: INTERNAL MEDICINE | Facility: CLINIC | Age: 38
End: 2018-08-28

## 2018-08-28 NOTE — TELEPHONE ENCOUNTER
----- Message from Brii Esqueda sent at 8/28/2018  2:11 PM EDT -----  Contact: pt - Dr sharma' pt - RE: return call  Pt calling and would like a return call. He informs is returning your call. Could you please call pt between 3-4 o'clock today? Please advise. Thanks      Pt # [phone]

## 2018-09-19 ENCOUNTER — OFFICE VISIT (OUTPATIENT)
Dept: INTERNAL MEDICINE | Facility: CLINIC | Age: 38
End: 2018-09-19

## 2018-09-19 VITALS
HEART RATE: 48 BPM | OXYGEN SATURATION: 96 % | DIASTOLIC BLOOD PRESSURE: 82 MMHG | HEIGHT: 72 IN | WEIGHT: 170 LBS | SYSTOLIC BLOOD PRESSURE: 122 MMHG | BODY MASS INDEX: 23.03 KG/M2

## 2018-09-19 DIAGNOSIS — Z00.00 PE (PHYSICAL EXAM), ANNUAL: Primary | ICD-10-CM

## 2018-09-19 DIAGNOSIS — H35.713 CENTRAL SEROUS CHORIORETINOPATHY OF BOTH EYES: ICD-10-CM

## 2018-09-19 DIAGNOSIS — J30.9 CHRONIC ALLERGIC RHINITIS, UNSPECIFIED SEASONALITY, UNSPECIFIED TRIGGER: ICD-10-CM

## 2018-09-19 DIAGNOSIS — Z12.5 SCREENING FOR PROSTATE CANCER: ICD-10-CM

## 2018-09-19 DIAGNOSIS — Z12.11 SCREENING FOR COLON CANCER: ICD-10-CM

## 2018-09-19 DIAGNOSIS — E78.00 HYPERCHOLESTEREMIA: ICD-10-CM

## 2018-09-19 DIAGNOSIS — J45.30 MILD PERSISTENT ASTHMA WITHOUT COMPLICATION: ICD-10-CM

## 2018-09-19 DIAGNOSIS — Z23 NEED FOR INFLUENZA VACCINATION: ICD-10-CM

## 2018-09-19 LAB
ALBUMIN SERPL-MCNC: 4.8 G/DL (ref 3.5–5.2)
ALBUMIN/GLOB SERPL: 1.8 G/DL
ALP SERPL-CCNC: 44 U/L (ref 39–117)
ALT SERPL W P-5'-P-CCNC: 33 U/L (ref 1–41)
ANION GAP SERPL CALCULATED.3IONS-SCNC: 11.6 MMOL/L
AST SERPL-CCNC: 28 U/L (ref 1–40)
BASOPHILS # BLD AUTO: 0.03 10*3/MM3 (ref 0–0.2)
BASOPHILS NFR BLD AUTO: 0.8 % (ref 0–2)
BILIRUB SERPL-MCNC: 1 MG/DL (ref 0.1–1.2)
BILIRUB UR QL CFM: NEGATIVE
BILIRUB UR QL STRIP: ABNORMAL
BUN BLD-MCNC: 9 MG/DL (ref 6–20)
BUN/CREAT SERPL: 10.2 (ref 7–25)
CALCIUM SPEC-SCNC: 9.8 MG/DL (ref 8.6–10.5)
CHLORIDE SERPL-SCNC: 99 MMOL/L (ref 98–107)
CHOLEST SERPL-MCNC: 155 MG/DL (ref 0–200)
CLARITY UR: CLEAR
CO2 SERPL-SCNC: 29.4 MMOL/L (ref 22–29)
COLOR UR: YELLOW
CREAT BLD-MCNC: 0.88 MG/DL (ref 0.76–1.27)
DEPRECATED RDW RBC AUTO: 42.2 FL (ref 37–54)
EOSINOPHIL # BLD AUTO: 0.06 10*3/MM3 (ref 0–0.7)
EOSINOPHIL NFR BLD AUTO: 1.6 % (ref 0–5)
ERYTHROCYTE [DISTWIDTH] IN BLOOD BY AUTOMATED COUNT: 12.6 % (ref 11.5–15)
GFR SERPL CREATININE-BSD FRML MDRD: 96 ML/MIN/1.73
GLOBULIN UR ELPH-MCNC: 2.7 GM/DL
GLUCOSE BLD-MCNC: 86 MG/DL (ref 65–99)
GLUCOSE UR STRIP-MCNC: NEGATIVE MG/DL
HCT VFR BLD AUTO: 44.4 % (ref 40.1–51)
HDLC SERPL-MCNC: 65 MG/DL (ref 40–60)
HEMOCCULT STL QL IA: NEGATIVE
HGB BLD-MCNC: 15.4 G/DL (ref 13.7–17.5)
HGB UR QL STRIP.AUTO: NEGATIVE
KETONES UR QL STRIP: ABNORMAL
LDLC SERPL CALC-MCNC: 78 MG/DL (ref 0–100)
LDLC/HDLC SERPL: 1.2 {RATIO}
LEUKOCYTE ESTERASE UR QL STRIP.AUTO: NEGATIVE
LYMPHOCYTES # BLD AUTO: 1.6 10*3/MM3 (ref 0.8–7)
LYMPHOCYTES NFR BLD AUTO: 41.5 % (ref 10–60)
MCH RBC QN AUTO: 32.5 PG (ref 26–34)
MCHC RBC AUTO-ENTMCNC: 34.7 G/DL (ref 31–37)
MCV RBC AUTO: 93.7 FL (ref 80–100)
MONOCYTES # BLD AUTO: 0.29 10*3/MM3 (ref 0–1)
MONOCYTES NFR BLD AUTO: 7.5 % (ref 0–13)
NEUTROPHILS # BLD AUTO: 1.88 10*3/MM3 (ref 1–11)
NEUTROPHILS NFR BLD AUTO: 48.6 % (ref 30–85)
NITRITE UR QL STRIP: NEGATIVE
PH UR STRIP.AUTO: 6 [PH] (ref 5–8)
PLATELET # BLD AUTO: 192 10*3/MM3 (ref 150–450)
PMV BLD AUTO: 11 FL (ref 6–12)
POTASSIUM BLD-SCNC: 4.5 MMOL/L (ref 3.5–5.2)
PROT SERPL-MCNC: 7.5 G/DL (ref 6–8.5)
PROT UR QL STRIP: NEGATIVE
PSA SERPL-MCNC: 0.61 NG/ML (ref 0–4)
RBC # BLD AUTO: 4.74 10*6/MM3 (ref 4.63–6.08)
SODIUM BLD-SCNC: 140 MMOL/L (ref 136–145)
SP GR UR STRIP: 1.02 (ref 1–1.03)
TRIGL SERPL-MCNC: 59 MG/DL (ref 0–150)
TSH SERPL-ACNC: 1.1 MIU/ML (ref 0.27–4.2)
UROBILINOGEN UR QL STRIP: ABNORMAL
VLDLC SERPL-MCNC: 11.8 MG/DL (ref 5–40)
WBC NRBC COR # BLD: 3.86 10*3/MM3 (ref 5–10)

## 2018-09-19 PROCEDURE — 81003 URINALYSIS AUTO W/O SCOPE: CPT | Performed by: NURSE PRACTITIONER

## 2018-09-19 PROCEDURE — 99395 PREV VISIT EST AGE 18-39: CPT | Performed by: NURSE PRACTITIONER

## 2018-09-19 PROCEDURE — 80061 LIPID PANEL: CPT | Performed by: NURSE PRACTITIONER

## 2018-09-19 PROCEDURE — 82274 ASSAY TEST FOR BLOOD FECAL: CPT | Performed by: NURSE PRACTITIONER

## 2018-09-19 PROCEDURE — 85025 COMPLETE CBC W/AUTO DIFF WBC: CPT | Performed by: NURSE PRACTITIONER

## 2018-09-19 PROCEDURE — 80053 COMPREHEN METABOLIC PANEL: CPT | Performed by: NURSE PRACTITIONER

## 2018-09-19 RX ORDER — CETIRIZINE HYDROCHLORIDE 10 MG/1
10 TABLET ORAL DAILY
Qty: 30 TABLET
Start: 2018-09-19 | End: 2019-04-19

## 2018-09-19 NOTE — PROGRESS NOTES
Subjective   Rush Ruiz is a 37 y.o. male who presents for a physical exam.    He started statin therapy (Atorvastatin) after last lipid panel showed elevated LDL, tolerated well without myalgias.  He c/o difficulty sleeping over the past several weeks which he attributes to recent breakup and increased stress level. However, he has change his diet significantly and has lost weight. He has also started running again which he is tolerating well.         The following portions of the patient's history were reviewed and updated as appropriate: allergies, current medications, past social history and problem list.    Past Medical History:   Diagnosis Date   • Allergic rhinitis    • Anxiety    • Asthma     intermittent asthma without complications.   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 Milton Pineda         Current Outpatient Prescriptions:   •  albuterol (PROVENTIL HFA;VENTOLIN HFA) 108 (90 BASE) MCG/ACT inhaler, Inhale 2 puffs Every 4 (Four) Hours As Needed for Wheezing., Disp: 1 inhaler, Rfl: 3  •  ALPRAZolam (XANAX) 0.25 MG tablet, Take 1 tablet by mouth 3 (Three) Times a Day As Needed for Anxiety., Disp: 15 tablet, Rfl: 0  •  atorvastatin (LIPITOR) 10 MG tablet, Take 1 tablet by mouth Daily., Disp: 90 tablet, Rfl: 1  •  Bilberry, Vaccinium myrtillus, 60 MG capsule, Take 280 mg by mouth., Disp: , Rfl:   •  budesonide-formoterol (SYMBICORT) 80-4.5 MCG/ACT inhaler, Inhale 2 puffs 2 (Two) Times a Day. Rinse and spit after use, Disp: 10.2 g, Rfl: 3  •  Flaxseed, Linseed, (FLAXSEED OIL PO), Take 1 capsule by mouth., Disp: , Rfl:   •  montelukast (SINGULAIR) 10 MG tablet, TAKE ONE TABLET BY MOUTH DAILY, Disp: 30 tablet, Rfl: 10  •  MULTIPLE VITAMINS-MINERALS PO, Take 1 capsule by mouth., Disp: , Rfl:   •  Unable to find, Take 900 mg by mouth., Disp: , Rfl:   •   Unable to find, Take 200 mg by mouth., Disp: , Rfl:   •  Unable to find, Take 1 capsule by mouth., Disp: , Rfl:   •  cetirizine (zyrTEC) 10 MG tablet, Take 1 tablet by mouth Daily., Disp: 30 tablet, Rfl:   No current facility-administered medications for this visit.     No Known Allergies    Review of Systems   Constitutional: Positive for appetite change (decreased appetite with recent increase in stress level) and fatigue. Negative for activity change, chills, diaphoresis, fever and unexpected weight change.   HENT: Positive for hearing loss (absence of left ear canal). Negative for congestion, dental problem, drooling, ear discharge, ear pain, facial swelling, mouth sores, nosebleeds, postnasal drip, rhinorrhea, sinus pressure, sore throat and trouble swallowing.    Eyes: Positive for visual disturbance (no acute changes). Negative for photophobia, pain, discharge, redness and itching.   Respiratory: Negative for apnea, cough, choking, chest tightness, shortness of breath and wheezing.    Cardiovascular: Negative for chest pain, palpitations and leg swelling.        No orthopnea, PND, HEWITT   Gastrointestinal: Negative for abdominal pain, blood in stool, constipation, diarrhea, nausea and vomiting.   Endocrine: Negative for cold intolerance, heat intolerance, polydipsia and polyuria.   Genitourinary: Negative for decreased urine volume, dysuria, enuresis, flank pain, frequency, hematuria and urgency.   Musculoskeletal: Negative for arthralgias, back pain, gait problem, joint swelling, myalgias, neck pain and neck stiffness.   Skin: Positive for rash. Negative for color change.        No hair changes, no nail changes   Allergic/Immunologic: Negative for environmental allergies, food allergies and immunocompromised state.   Neurological: Negative for dizziness, tremors, seizures, syncope, speech difficulty, weakness, light-headedness, numbness and headaches.   Hematological: Negative for adenopathy. Does not  "bruise/bleed easily.   Psychiatric/Behavioral: Positive for decreased concentration, dysphoric mood and sleep disturbance. Negative for agitation, confusion and suicidal ideas. The patient is not nervous/anxious.        Objective   Vitals:    09/19/18 0809   BP: 122/82   BP Location: Left arm   Patient Position: Sitting   Cuff Size: Adult   Pulse: (!) 48   SpO2: 96%   Weight: 77.1 kg (170 lb)   Height: 182.9 cm (72\")     Physical Exam   Constitutional: He is oriented to person, place, and time. He appears well-developed and well-nourished. No distress.   HENT:   Head: Normocephalic and atraumatic.   Right Ear: External ear normal.   Left Ear: External ear normal.   Nose: Nose normal.   Mouth/Throat: Oropharynx is clear and moist.   Eyes: Pupils are equal, round, and reactive to light. Conjunctivae and EOM are normal. Right eye exhibits no discharge. Left eye exhibits no discharge. No scleral icterus.   Neck: Normal range of motion. Neck supple. No JVD present. No tracheal deviation present. No thyromegaly present.   Cardiovascular: Normal rate, regular rhythm, normal heart sounds and intact distal pulses.  Exam reveals no gallop and no friction rub.    No murmur heard.  Pulmonary/Chest: Effort normal and breath sounds normal. No respiratory distress. He has no wheezes. He has no rales. He exhibits no tenderness.   Abdominal: Soft. Bowel sounds are normal. He exhibits no distension and no mass. There is no tenderness. There is no rebound and no guarding. No hernia.   Genitourinary: Rectum normal, prostate normal and penis normal. Rectal exam shows guaiac negative stool.   Musculoskeletal: Normal range of motion. He exhibits no edema.   Lymphadenopathy:     He has no cervical adenopathy.   Neurological: He is alert and oriented to person, place, and time. He has normal reflexes. No cranial nerve deficit. He exhibits normal muscle tone. Coordination normal.   Skin: Skin is warm and dry. No rash noted. No erythema. "   Psychiatric: He has a normal mood and affect. His behavior is normal. Judgment and thought content normal.   Vitals reviewed.      Assessment/Plan   Rush was seen today for annual exam.    Diagnoses and all orders for this visit:    PE (physical exam), annual  -     CBC Auto Differential; Future  -     Comprehensive Metabolic Panel; Future  -     Lipid Panel; Future  -     TSH; Future  -     Urinalysis With Microscopic If Indicated (No Culture) - Urine, Clean Catch; Future  -     CBC Auto Differential  -     Comprehensive Metabolic Panel  -     Lipid Panel  -     TSH  -     Urinalysis With Microscopic If Indicated (No Culture) - Urine, Clean Catch  -     Ictotest, Urine - Urine, Clean Catch; Future  -     Ictotest, Urine - Urine, Clean Catch    Central serous chorioretinopathy of both eyes  Comments:  followed by opth    Hypercholesteremia  Comments:  started Atorvastatin 3/2018 and is tolerating well    Mild persistent asthma without complication  Comments:  doing well with daily Symbicort    Screening for prostate cancer  -     PSA Screen; Future  -     PSA Screen    Screening for colon cancer  -     POC FECAL OCCULT BLOOD BY IMMUNOASSAY    Need for influenza vaccination  -     Influenza Vac Subunit Quad (FLUCELVAX) injection 0.5 mL; Inject 0.5 mL into the appropriate muscle as directed by prescriber 1 (One) Time.    Other orders  -     cetirizine (zyrTEC) 10 MG tablet; Take 1 tablet by mouth Daily.    Risk assessment:  He has started exercising (running) again and has changed his diet, follows a vegetarian diet and has for years. He has lost leah 20# over the past month which he states has been intentional.  His biggest complaint today is insomnia, has tried a tea as well as supplements with mild improvement. He attributes some of this to recent increased stress level. He has started seeing a counselor, appears situational due to recent breakup.  He has had elevated cholesterol in the past despite dietary  changes (follows vegetarian diet), started Atorvastatin 3/2018-will recheck lipid panel today.    Prevention:  Influenza vaccine is given today.  He had a normal colonoscopy 12/2017, +family hx colon cancer.  Discussed insomnia, will try Melatonin if sx persist. However, may needed further intervention at which point he will f/u.

## 2018-10-03 ENCOUNTER — TELEPHONE (OUTPATIENT)
Dept: INTERNAL MEDICINE | Facility: CLINIC | Age: 38
End: 2018-10-03

## 2018-10-03 NOTE — TELEPHONE ENCOUNTER
----- Message from Terese Arciniega sent at 10/3/2018  8:37 AM EDT -----  Contact: Pt   Pt was seen 9/19 and he is still having trouble sleeping.  He was asked to call back if this continued to see if he could get something called in.      HARVEY [address] AT [address] [place] [address] - [phone] [place] [phone] FX      Pt# [phone]

## 2018-10-08 ENCOUNTER — TELEPHONE (OUTPATIENT)
Dept: INTERNAL MEDICINE | Facility: CLINIC | Age: 38
End: 2018-10-08

## 2018-10-08 NOTE — TELEPHONE ENCOUNTER
----- Message from Terese Arciniega sent at 10/5/2018  3:11 PM EDT -----  Contact: Pt  Pt would like a call from Kristin regarding two new prescriptions.  Wants to know what the difference is.        Pt# [phone]

## 2018-10-08 NOTE — TELEPHONE ENCOUNTER
Called pt back for  more info, pt states he will Take trazodone PRN, only when he feels he needs this and because of drowsiness he wanted to know if he could take a half tablet instead of whole one , is this ok ?, please advise thanks.

## 2018-10-15 ENCOUNTER — OFFICE VISIT (OUTPATIENT)
Dept: CARDIOLOGY | Facility: CLINIC | Age: 38
End: 2018-10-15

## 2018-10-15 VITALS
HEART RATE: 60 BPM | BODY MASS INDEX: 23.16 KG/M2 | HEIGHT: 72 IN | SYSTOLIC BLOOD PRESSURE: 110 MMHG | WEIGHT: 171 LBS | DIASTOLIC BLOOD PRESSURE: 64 MMHG

## 2018-10-15 DIAGNOSIS — E78.2 MIXED HYPERLIPIDEMIA: Primary | ICD-10-CM

## 2018-10-15 PROCEDURE — 99214 OFFICE O/P EST MOD 30 MIN: CPT | Performed by: INTERNAL MEDICINE

## 2018-10-15 PROCEDURE — 93000 ELECTROCARDIOGRAM COMPLETE: CPT | Performed by: INTERNAL MEDICINE

## 2018-10-15 NOTE — PROGRESS NOTES
Rush Ruiz  1980  Date of Office Visit: 10/15/2018  Encounter Provider: Nick Hopson MD  Place of Service: [facility]      CHIEF COMPLAINT:  Hyperlipidemia  Family history of early coronary artery disease  Asthma    HISTORY OF PRESENT ILLNESS:Dr. Camarillo,   I had the pleasure of seeing your patient in follow-up today. As you know, he is a very pleasant 38-year-old gentleman with a medical history of acid reflux, asthma, prior Hep C antibody positive testing, who presented to me secondary to hyperlipidemia and his family history of CAD.  The patient's father had a myocardial infarction at age 38.  He was smoking at the time.     Since our last visit he has been doing well from a cardiac standpoint.  He denies any chest pain consistent with angina.  He has actually finally thrown up the white flag and started a statin.  He is tolerating this well with no significant myalgias.  His last laboratory work obtained by your office about a month ago showed his LDL had decreased substantially.  He is actually now has an LDL of 78 and a HDL of 65.  This is less than half of where he was from an LDL standpoint previously.      He is suffering from insomnia secondary to relationship issues and a breakup.  He states his depression and anxiety have been increased since that time.          Review of Systems   Constitution: Negative for fever, weakness and malaise/fatigue.   HENT: Negative for nosebleeds and sore throat.    Eyes: Negative for blurred vision and double vision.   Cardiovascular: Negative for chest pain, claudication, palpitations and syncope.   Respiratory: Negative for cough, shortness of breath and snoring.    Endocrine: Negative for cold intolerance, heat intolerance and polydipsia.   Skin: Negative for itching, poor wound healing and rash.   Musculoskeletal: Negative for joint pain, joint swelling, muscle weakness and myalgias.   Gastrointestinal: Negative  for abdominal pain, melena, nausea and vomiting.   Neurological: Negative for light-headedness, loss of balance, seizures and vertigo.   Psychiatric/Behavioral: Positive for depression. Negative for altered mental status.          Past Medical History:   Diagnosis Date   • Allergic rhinitis    • Anxiety    • Asthma     intermittent asthma without complications.   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 Milton Pineda       The following portions of the patient's history were reviewed and updated as appropriate: Social history , Family history and Surgical history     Current Outpatient Prescriptions on File Prior to Visit   Medication Sig Dispense Refill   • albuterol (PROVENTIL HFA;VENTOLIN HFA) 108 (90 BASE) MCG/ACT inhaler Inhale 2 puffs Every 4 (Four) Hours As Needed for Wheezing. 1 inhaler 3   • atorvastatin (LIPITOR) 10 MG tablet Take 1 tablet by mouth Daily. 90 tablet 1   • Bilberry, Vaccinium myrtillus, 60 MG capsule Take 280 mg by mouth.     • budesonide-formoterol (SYMBICORT) 80-4.5 MCG/ACT inhaler Inhale 2 puffs 2 (Two) Times a Day. Rinse and spit after use 10.2 g 3   • cetirizine (zyrTEC) 10 MG tablet Take 1 tablet by mouth Daily. 30 tablet    • Flaxseed, Linseed, (FLAXSEED OIL PO) Take 1 capsule by mouth.     • montelukast (SINGULAIR) 10 MG tablet TAKE ONE TABLET BY MOUTH DAILY 30 tablet 10   • MULTIPLE VITAMINS-MINERALS PO Take 1 capsule by mouth.     • Unable to find Take 900 mg by mouth.     • Unable to find Take 200 mg by mouth.     • Unable to find Take 1 capsule by mouth.     • ALPRAZolam (XANAX) 0.25 MG tablet Take 1 tablet by mouth 3 (Three) Times a Day As Needed for Anxiety. 15 tablet 0     No current facility-administered medications on file prior to visit.        No Known Allergies    Vitals:    10/15/18 1002   BP: 110/64   BP Location:  "Right arm   Patient Position: Sitting   Pulse: 60   Weight: 77.6 kg (171 lb)   Height: 182.9 cm (72\")     Physical Exam   Constitutional: He is oriented to person, place, and time. He appears well-developed and well-nourished.   HENT:   Head: Normocephalic and atraumatic.   Eyes: Conjunctivae and EOM are normal. No scleral icterus.   Neck: Normal range of motion. Neck supple. Normal carotid pulses, no hepatojugular reflux and no JVD present. Carotid bruit is not present. No tracheal deviation present. No thyromegaly present.   Cardiovascular: Regular rhythm.  Bradycardia present.  Exam reveals no gallop and no friction rub.    No murmur heard.  Pulses:       Carotid pulses are 2+ on the right side, and 2+ on the left side.       Radial pulses are 2+ on the right side, and 2+ on the left side.        Femoral pulses are 2+ on the right side, and 2+ on the left side.       Dorsalis pedis pulses are 2+ on the right side, and 2+ on the left side.        Posterior tibial pulses are 2+ on the right side, and 2+ on the left side.   Pulmonary/Chest: Breath sounds normal. No respiratory distress. He has no decreased breath sounds. He has no wheezes. He has no rhonchi. He has no rales. He exhibits no tenderness.   Abdominal: Soft. Bowel sounds are normal. He exhibits no distension. There is no tenderness. There is no rebound.   Musculoskeletal: He exhibits no edema or deformity.   Neurological: He is alert and oriented to person, place, and time. He has normal strength. No sensory deficit.   Skin: No rash noted. No erythema.   Psychiatric: He has a normal mood and affect. His behavior is normal.       No components found for: CBC  No results found for: CMP  No components found for: LIPID  No results found for: BMP      ECG 12 Lead  Date/Time: 10/15/2018 10:32 AM  Performed by: CARI GALICIA  Authorized by: CARI GALICIA   Comparison: compared with previous ECG from 10/20/2017  Comparison to previous ECG: Heart " rate has increased 20 bpm  Rhythm: sinus rhythm  Rate: normal  QRS axis: normal  Clinical impression: normal ECG               DISCUSSION/SUMMARY  38-year-old male with a family history of early myocardial infarction and poorly controlled hyperlipidemia.  He has now started a statin and is doing very well on that.  His cholesterol panel has improved.  He is more active than he was on her last visit and has no angina or dyspnea on exertion.    Hyperlipidemia: Continue current statin therapy.  This is being managed by primary  Family history of early myocardial infarction: Patient is asymptomatic at this time.  I would not recommend additional workup.    He continued back in one year.  We will likely discharge him back in your care at that time

## 2018-10-17 ENCOUNTER — TELEPHONE (OUTPATIENT)
Dept: INTERNAL MEDICINE | Facility: CLINIC | Age: 38
End: 2018-10-17

## 2018-10-17 NOTE — TELEPHONE ENCOUNTER
----- Message from Terese Arciniega sent at 10/17/2018  9:40 AM EDT -----  Contact: pt   Pt has started   atorvastatin (LIPITOR) 10 MG tablet  Has started getting ringing in his ears worsening to where he cant sleep.  Would like to talk about this.    Pt states he is on trazodone and it is not helping him for sleep. (Did not see in chart)      Please call  Pt# [phone]

## 2018-10-19 ENCOUNTER — OFFICE VISIT (OUTPATIENT)
Dept: INTERNAL MEDICINE | Facility: CLINIC | Age: 38
End: 2018-10-19

## 2018-10-19 VITALS
HEART RATE: 83 BPM | BODY MASS INDEX: 23.43 KG/M2 | DIASTOLIC BLOOD PRESSURE: 82 MMHG | OXYGEN SATURATION: 97 % | HEIGHT: 72 IN | WEIGHT: 173 LBS | SYSTOLIC BLOOD PRESSURE: 120 MMHG

## 2018-10-19 DIAGNOSIS — F51.02 ADJUSTMENT INSOMNIA: Primary | ICD-10-CM

## 2018-10-19 DIAGNOSIS — J45.30 MILD PERSISTENT ASTHMA WITHOUT COMPLICATION: ICD-10-CM

## 2018-10-19 PROCEDURE — 99214 OFFICE O/P EST MOD 30 MIN: CPT | Performed by: NURSE PRACTITIONER

## 2018-10-19 RX ORDER — TRAZODONE HYDROCHLORIDE 50 MG/1
50 TABLET ORAL NIGHTLY
COMMUNITY
End: 2018-10-19

## 2018-10-19 RX ORDER — ZOLPIDEM TARTRATE 10 MG/1
10 TABLET ORAL NIGHTLY PRN
Qty: 30 TABLET | Refills: 0 | Status: SHIPPED | OUTPATIENT
Start: 2018-10-19 | End: 2019-10-01

## 2018-10-20 NOTE — PROGRESS NOTES
Subjective   Rush Ruiz is a 38 y.o. male who presents due to insomnia. He also c/o increased congestion and cough.    He states Trazodone was helpful the first evening he took it but was not after that point, he increased to 2 tablets daily with no significant improvement. He denies a previous history of insomnia, has had difficulty since going through a breakup leah 1 month ago. He is now living back in his house and is overall feeling better, does c/o increased chest tightness and drainage.      Insomnia   This is a new problem. The current episode started more than 1 month ago. The problem occurs daily. The problem has been unchanged. Associated symptoms include congestion, coughing, fatigue and a sore throat. Pertinent negatives include no abdominal pain, arthralgias, chest pain, chills, fever, headaches, joint swelling, nausea, vomiting or weakness. The symptoms are aggravated by stress. Treatments tried: Trazodone, Melatonin, Tylenol PM. The treatment provided no relief.   URI    This is a new problem. The current episode started 1 to 4 weeks ago. The problem has been waxing and waning. There has been no fever. Associated symptoms include congestion, coughing, rhinorrhea, a sore throat and wheezing. Pertinent negatives include no abdominal pain, chest pain, dysuria, ear pain, headaches, nausea or vomiting. He has tried antihistamine for the symptoms. The treatment provided mild relief.        The following portions of the patient's history were reviewed and updated as appropriate: allergies, current medications, past social history and problem list.    Past Medical History:   Diagnosis Date   • Allergic rhinitis    • Anxiety    • Asthma     intermittent asthma without complications.   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 Milton Pineda         Current  Outpatient Prescriptions:   •  albuterol (PROVENTIL HFA;VENTOLIN HFA) 108 (90 BASE) MCG/ACT inhaler, Inhale 2 puffs Every 4 (Four) Hours As Needed for Wheezing., Disp: 1 inhaler, Rfl: 3  •  ALPRAZolam (XANAX) 0.25 MG tablet, Take 1 tablet by mouth 3 (Three) Times a Day As Needed for Anxiety., Disp: 15 tablet, Rfl: 0  •  atorvastatin (LIPITOR) 10 MG tablet, Take 1 tablet by mouth Daily., Disp: 90 tablet, Rfl: 1  •  Bilberry, Vaccinium myrtillus, 60 MG capsule, Take 280 mg by mouth., Disp: , Rfl:   •  budesonide-formoterol (SYMBICORT) 80-4.5 MCG/ACT inhaler, Inhale 2 puffs 2 (Two) Times a Day. Rinse and spit after use, Disp: 10.2 g, Rfl: 3  •  cetirizine (zyrTEC) 10 MG tablet, Take 1 tablet by mouth Daily., Disp: 30 tablet, Rfl:   •  Flaxseed, Linseed, (FLAXSEED OIL PO), Take 1 capsule by mouth., Disp: , Rfl:   •  montelukast (SINGULAIR) 10 MG tablet, TAKE ONE TABLET BY MOUTH DAILY, Disp: 30 tablet, Rfl: 10  •  MULTIPLE VITAMINS-MINERALS PO, Take 1 capsule by mouth., Disp: , Rfl:   •  Unable to find, Take 900 mg by mouth., Disp: , Rfl:   •  Unable to find, Take 200 mg by mouth., Disp: , Rfl:   •  Unable to find, Take 1 capsule by mouth., Disp: , Rfl:   •  zolpidem (AMBIEN) 10 MG tablet, Take 1 tablet by mouth At Night As Needed for Sleep., Disp: 30 tablet, Rfl: 0    No Known Allergies    Review of Systems   Constitutional: Positive for fatigue. Negative for chills, fever and unexpected weight change.   HENT: Positive for congestion, rhinorrhea and sore throat. Negative for ear pain, postnasal drip, sinus pressure and trouble swallowing.    Eyes: Negative for visual disturbance.   Respiratory: Positive for cough, chest tightness and wheezing.    Cardiovascular: Negative for chest pain, palpitations and leg swelling.   Gastrointestinal: Negative for abdominal pain, blood in stool, nausea and vomiting.   Genitourinary: Negative for dysuria.   Musculoskeletal: Negative for arthralgias and joint swelling.   Neurological:  "Negative for syncope, weakness and headaches.   Hematological: Does not bruise/bleed easily.   Psychiatric/Behavioral: Positive for sleep disturbance. The patient has insomnia.        Objective   Vitals:    10/19/18 1325   BP: 120/82   BP Location: Left arm   Patient Position: Sitting   Cuff Size: Adult   Pulse: 83   SpO2: 97%   Weight: 78.5 kg (173 lb)   Height: 182.9 cm (72\")     Physical Exam   Constitutional: He appears well-developed and well-nourished. He is cooperative. He does not have a sickly appearance. He does not appear ill.   HENT:   Head: Normocephalic.   Right Ear: Hearing, tympanic membrane and external ear normal.   Left Ear: Hearing, tympanic membrane and external ear normal.   Nose: Nose normal. No mucosal edema, rhinorrhea, sinus tenderness or nasal deformity. Right sinus exhibits no maxillary sinus tenderness and no frontal sinus tenderness. Left sinus exhibits no maxillary sinus tenderness and no frontal sinus tenderness.   Mouth/Throat: Oropharynx is clear and moist and mucous membranes are normal. Normal dentition.   Eyes: Pupils are equal, round, and reactive to light. Conjunctivae and lids are normal. Right eye exhibits no discharge and no exudate. Left eye exhibits no discharge and no exudate.   Neck: Trachea normal and normal range of motion. Carotid bruit is not present. No edema present. No thyroid mass and no thyromegaly present.   Cardiovascular: Regular rhythm, normal heart sounds and normal pulses.    No murmur heard.  Pulmonary/Chest: Breath sounds normal. No respiratory distress. He has no decreased breath sounds. He has no wheezes. He has no rhonchi. He has no rales.   Lymphadenopathy:        Head (right side): No submental, no submandibular, no tonsillar, no preauricular, no posterior auricular and no occipital adenopathy present.        Head (left side): No submental, no submandibular, no tonsillar, no preauricular, no posterior auricular and no occipital adenopathy present. "   Neurological: He is alert.   Skin: Skin is warm, dry and intact. No cyanosis. Nails show no clubbing.       Assessment/Plan   Rush was seen today for insomnia and uri.    Diagnoses and all orders for this visit:    Adjustment insomnia  -     zolpidem (AMBIEN) 10 MG tablet; Take 1 tablet by mouth At Night As Needed for Sleep.    Mild persistent asthma without complication  Comments:  continue Monteulkast & Zyrtec, add daily Symbicort for better mgmt of sx    Discussed use of Ambien and indication for short-term use only, he has tried several other medications without improvement. I believe this would be good option for him but have stressed the importance of using short-term only, he may use nightly for now until sleep cycle improves and then discontinue medication.    His asthma does not appear well-controlled, he will start using Symbicort daily and f/u of sx persist.      BENJAMIN query complete. Treatment plan to include limited course of prescribed  controlled substance. Risks including addiction, benefits, and alternatives presented to patient.

## 2018-10-29 RX ORDER — TRAZODONE HYDROCHLORIDE 50 MG/1
TABLET ORAL
Qty: 30 TABLET | Refills: 0 | Status: SHIPPED | OUTPATIENT
Start: 2018-10-29 | End: 2019-02-25 | Stop reason: SDUPTHER

## 2018-10-29 RX ORDER — DILTIAZEM HYDROCHLORIDE 60 MG/1
TABLET, FILM COATED ORAL
Qty: 1 INHALER | Refills: 2 | Status: SHIPPED | OUTPATIENT
Start: 2018-10-29 | End: 2019-10-01 | Stop reason: SDUPTHER

## 2018-12-12 ENCOUNTER — TELEPHONE (OUTPATIENT)
Dept: INTERNAL MEDICINE | Facility: CLINIC | Age: 38
End: 2018-12-12

## 2018-12-12 DIAGNOSIS — Z20.2 EXPOSURE TO STD: Primary | ICD-10-CM

## 2018-12-12 NOTE — TELEPHONE ENCOUNTER
I would advise him to stop the Lipitor for 2 weeks and see if his tendonitis improves. Please ask him to call with an update of his sx at that time, if it is improved we can give Crestor instead.  I will order a STD panel as well. Thanks.

## 2018-12-12 NOTE — TELEPHONE ENCOUNTER
----- Message from Terese Arciniega sent at 12/12/2018 12:08 PM EST -----  Contact: Pt   Pt states he is on Lipitor, which is working but he feels it is effecting his   Tendonitis.  Wanted to know if there was anything he could try different.    Also would like to get a STD blood panel test.    Pt# [phone]       HARVEY [address] AT [address] [place] [address] [phone] [facility] [phone]

## 2018-12-19 ENCOUNTER — LAB (OUTPATIENT)
Dept: INTERNAL MEDICINE | Facility: CLINIC | Age: 38
End: 2018-12-19

## 2018-12-19 DIAGNOSIS — Z20.2 EXPOSURE TO STD: Primary | ICD-10-CM

## 2018-12-19 LAB
HIV1 P24 AG SER QL: NORMAL
HIV1+2 AB SER QL: NORMAL

## 2018-12-19 PROCEDURE — 87899 AGENT NOS ASSAY W/OPTIC: CPT | Performed by: NURSE PRACTITIONER

## 2018-12-19 PROCEDURE — 86803 HEPATITIS C AB TEST: CPT | Performed by: NURSE PRACTITIONER

## 2018-12-19 PROCEDURE — G0432 EIA HIV-1/HIV-2 SCREEN: HCPCS | Performed by: NURSE PRACTITIONER

## 2018-12-19 PROCEDURE — 36415 COLL VENOUS BLD VENIPUNCTURE: CPT | Performed by: NURSE PRACTITIONER

## 2018-12-20 LAB
HSV1 IGG SER IA-ACNC: 27.3 INDEX (ref 0–0.9)
HSV2 IGG SER IA-ACNC: <0.91 INDEX (ref 0–0.9)
RPR SER QL: NON REACTIVE

## 2018-12-21 LAB
C TRACH RRNA SPEC DONR QL NAA+PROBE: NEGATIVE
HCV AB SER DONR QL: NORMAL
N GONORRHOEA DNA SPEC QL NAA+PROBE: NEGATIVE

## 2019-01-04 ENCOUNTER — TELEPHONE (OUTPATIENT)
Dept: INTERNAL MEDICINE | Facility: CLINIC | Age: 39
End: 2019-01-04

## 2019-01-04 DIAGNOSIS — E78.00 HYPERCHOLESTEREMIA: Primary | ICD-10-CM

## 2019-01-04 RX ORDER — ROSUVASTATIN CALCIUM 5 MG/1
5 TABLET, COATED ORAL DAILY
Qty: 30 TABLET | Refills: 1 | Status: SHIPPED | OUTPATIENT
Start: 2019-01-04 | End: 2019-03-28 | Stop reason: SDUPTHER

## 2019-01-04 NOTE — TELEPHONE ENCOUNTER
I recommend switching to Pravastatin (Pravachol) or Crestor, please ask him to stop Lipitor until his tendonitis has resolved and then begin Crestor 5mg (normally we begin 10mg but he may start 5mg) qd. Thanks.

## 2019-01-04 NOTE — TELEPHONE ENCOUNTER
----- Message from Brii Esqueda sent at 1/4/2019  9:13 AM EST -----  Contact: pt - Kristin's pt - RE: new Rx  Pt calling and would like a return call regarding Rx he was prescribed. He informs is having complications: Lipitor, triggering his tenonitis. He informs has stopped & restarted Rx 3 times and is still triggering tenonitis. Could you please call new Rx to pharmacy? Could you please call pt to discuss? Please advise. Thanks      [facility] [address] AT [address] [facility] [phone] [place] [phone]     Pt # [phone]

## 2019-02-06 ENCOUNTER — TELEPHONE (OUTPATIENT)
Dept: INTERNAL MEDICINE | Facility: CLINIC | Age: 39
End: 2019-02-06

## 2019-02-06 NOTE — TELEPHONE ENCOUNTER
----- Message from Brii Esqueda sent at 2/6/2019  2:34 PM EST -----  Contact: pt - Kristin's pt - RE: Referral  Pt calling and would like a return call regarding a referral for TMJ. He would like a referral for pt to be seen. Could you please call pt to discuss? Please advise. Thanks      Pt # [phone]

## 2019-02-06 NOTE — TELEPHONE ENCOUNTER
Has he discussed with his dentist? That usually is a good place to start. He may also see a specialist such as Chencho Adams DMD (with [facility]). Thanks.

## 2019-02-25 RX ORDER — TRAZODONE HYDROCHLORIDE 50 MG/1
TABLET ORAL
Qty: 30 TABLET | Refills: 5 | Status: SHIPPED | OUTPATIENT
Start: 2019-02-25 | End: 2020-03-18 | Stop reason: SDUPTHER

## 2019-03-18 ENCOUNTER — TELEPHONE (OUTPATIENT)
Dept: INTERNAL MEDICINE | Facility: CLINIC | Age: 39
End: 2019-03-18

## 2019-03-27 ENCOUNTER — OFFICE VISIT (OUTPATIENT)
Dept: INTERNAL MEDICINE | Facility: CLINIC | Age: 39
End: 2019-03-27

## 2019-03-27 VITALS
SYSTOLIC BLOOD PRESSURE: 118 MMHG | WEIGHT: 167 LBS | DIASTOLIC BLOOD PRESSURE: 76 MMHG | HEIGHT: 72 IN | BODY MASS INDEX: 22.62 KG/M2

## 2019-03-27 DIAGNOSIS — E78.00 HYPERCHOLESTEREMIA: Primary | Chronic | ICD-10-CM

## 2019-03-27 DIAGNOSIS — J45.30 MILD PERSISTENT ASTHMA WITHOUT COMPLICATION: Chronic | ICD-10-CM

## 2019-03-27 DIAGNOSIS — Z82.49 FAMILY HISTORY OF CARDIOVASCULAR DISEASE: ICD-10-CM

## 2019-03-27 DIAGNOSIS — H35.713 CENTRAL SEROUS CHORIORETINOPATHY OF BOTH EYES: Chronic | ICD-10-CM

## 2019-03-27 LAB
ALBUMIN SERPL-MCNC: 4.7 G/DL (ref 3.5–5.2)
ALBUMIN/GLOB SERPL: 1.7 G/DL
ALP SERPL-CCNC: 42 U/L (ref 39–117)
ALT SERPL W P-5'-P-CCNC: 28 U/L (ref 1–41)
ANION GAP SERPL CALCULATED.3IONS-SCNC: 11.4 MMOL/L
AST SERPL-CCNC: 23 U/L (ref 1–40)
BILIRUB SERPL-MCNC: 0.5 MG/DL (ref 0.2–1.2)
BUN BLD-MCNC: 10 MG/DL (ref 6–20)
BUN/CREAT SERPL: 11.1 (ref 7–25)
CALCIUM SPEC-SCNC: 9.3 MG/DL (ref 8.6–10.5)
CHLORIDE SERPL-SCNC: 99 MMOL/L (ref 98–107)
CHOLEST SERPL-MCNC: 168 MG/DL (ref 0–200)
CO2 SERPL-SCNC: 29.6 MMOL/L (ref 22–29)
CREAT BLD-MCNC: 0.9 MG/DL (ref 0.76–1.27)
GFR SERPL CREATININE-BSD FRML MDRD: 93 ML/MIN/1.73
GLOBULIN UR ELPH-MCNC: 2.7 GM/DL
GLUCOSE BLD-MCNC: 97 MG/DL (ref 65–99)
HDLC SERPL-MCNC: 65 MG/DL (ref 40–60)
LDLC SERPL CALC-MCNC: 91 MG/DL (ref 0–100)
LDLC/HDLC SERPL: 1.4 {RATIO}
POTASSIUM BLD-SCNC: 4.8 MMOL/L (ref 3.5–5.2)
PROT SERPL-MCNC: 7.4 G/DL (ref 6–8.5)
SODIUM BLD-SCNC: 140 MMOL/L (ref 136–145)
TRIGL SERPL-MCNC: 60 MG/DL (ref 0–150)
VLDLC SERPL-MCNC: 12 MG/DL (ref 5–40)

## 2019-03-27 PROCEDURE — 80053 COMPREHEN METABOLIC PANEL: CPT | Performed by: INTERNAL MEDICINE

## 2019-03-27 PROCEDURE — 80061 LIPID PANEL: CPT | Performed by: INTERNAL MEDICINE

## 2019-03-27 PROCEDURE — 99214 OFFICE O/P EST MOD 30 MIN: CPT | Performed by: INTERNAL MEDICINE

## 2019-03-27 NOTE — PROGRESS NOTES
Subjective   Rush Ruiz is a 38 y.o. male.  Presents with a chief complaint of hyperlipidemia and a strong family history of heart disease, sciatica mild asthma that is well controlled currently, chronic anxiety that the patient is doing well managing on his own, and chorioretinopathy of both eyes, here for evaluation and lab check.  Patient is doing well on all of his current medications I am going to check a CMP and a lipid profile but otherwise have no changes to offer at this time.  We discussed the importance of him keeping up with regular ocular checks and routine visits to this office every 6 months.    History of Present Illness doing well overall    The following portions of the patient's history were reviewed and updated as appropriate: allergies, current medications, past family history, past medical history, past social history, past surgical history and problem list.    Review of Systems   Constitutional: Negative.    HENT: Negative.    Eyes: Positive for visual disturbance.   Respiratory: Negative.    Cardiovascular: Negative.    Gastrointestinal: Negative.    Endocrine: Negative.    Genitourinary: Negative.    Musculoskeletal: Negative.    Skin: Negative.    Allergic/Immunologic: Negative.    Neurological: Negative.    Hematological: Negative.    Psychiatric/Behavioral: Negative.        Objective   Physical Exam   Constitutional: He is oriented to person, place, and time. He appears well-developed and well-nourished.   HENT:   Head: Normocephalic and atraumatic.   Eyes: EOM are normal. Pupils are equal, round, and reactive to light.   Neck: Normal range of motion.   Cardiovascular: Normal rate, regular rhythm and normal heart sounds.   Pulmonary/Chest: Effort normal and breath sounds normal.   Abdominal: Soft. Bowel sounds are normal.   Musculoskeletal: Normal range of motion.   Neurological: He is alert and oriented to person, place, and time.   Skin: Skin is warm and dry.   Psychiatric: He has a  normal mood and affect.   Nursing note and vitals reviewed.        Assessment/Plan   Rush was seen today for hyperlipidemia.    Diagnoses and all orders for this visit:    Hypercholesteremia  Comments:  check lipids  Orders:  -     Comprehensive metabolic panel; Future  -     Lipid panel; Future    Mild persistent asthma without complication  Comments:  well controlled    Central serous chorioretinopathy of both eyes  Comments:  regular eye checks     Family history of cardiovascular disease  Comments:  no manifestations as of yet

## 2019-03-28 DIAGNOSIS — E78.00 HYPERCHOLESTEREMIA: ICD-10-CM

## 2019-03-28 RX ORDER — ROSUVASTATIN CALCIUM 5 MG/1
TABLET, COATED ORAL
Qty: 30 TABLET | Refills: 5 | Status: SHIPPED | OUTPATIENT
Start: 2019-03-28 | End: 2019-11-12 | Stop reason: SDUPTHER

## 2019-04-04 ENCOUNTER — TELEPHONE (OUTPATIENT)
Dept: INTERNAL MEDICINE | Facility: CLINIC | Age: 39
End: 2019-04-04

## 2019-04-04 DIAGNOSIS — Z20.2 EXPOSURE TO STD: Primary | ICD-10-CM

## 2019-04-04 NOTE — TELEPHONE ENCOUNTER
----- Message from Terese rAciniega sent at 4/4/2019  9:45 AM EDT -----  Contact: Pt   Pt is calling regarding recent lab results.  He had requested an STD panel be done, and did not see this on there.  If possible could he have this ordered and come in to be tested.      Pt# [phone]

## 2019-04-16 ENCOUNTER — TELEPHONE (OUTPATIENT)
Dept: INTERNAL MEDICINE | Facility: CLINIC | Age: 39
End: 2019-04-16

## 2019-04-16 DIAGNOSIS — F41.9 ANXIETY: ICD-10-CM

## 2019-04-16 RX ORDER — ALPRAZOLAM 0.25 MG/1
0.25 TABLET ORAL 3 TIMES DAILY PRN
Qty: 45 TABLET | Refills: 0 | Status: SHIPPED | OUTPATIENT
Start: 2019-04-16 | End: 2019-08-01 | Stop reason: SDUPTHER

## 2019-04-16 NOTE — TELEPHONE ENCOUNTER
I have increasing number of Xanax available to the patient from 15-45 and I recommend that he consider cognitive behavioral therapy prior to flying
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
## 2019-04-19 ENCOUNTER — OFFICE VISIT (OUTPATIENT)
Dept: INTERNAL MEDICINE | Facility: CLINIC | Age: 39
End: 2019-04-19

## 2019-04-19 VITALS
SYSTOLIC BLOOD PRESSURE: 116 MMHG | HEIGHT: 72 IN | WEIGHT: 164 LBS | BODY MASS INDEX: 22.21 KG/M2 | DIASTOLIC BLOOD PRESSURE: 74 MMHG

## 2019-04-19 DIAGNOSIS — Z20.2 EXPOSURE TO STD: ICD-10-CM

## 2019-04-19 DIAGNOSIS — J45.30 MILD PERSISTENT ASTHMA WITHOUT COMPLICATION: ICD-10-CM

## 2019-04-19 DIAGNOSIS — L25.5 CONTACT DERMATITIS DUE TO PLANT: Primary | ICD-10-CM

## 2019-04-19 LAB
HCV AB SER DONR QL: NORMAL
HIV1+2 AB SER QL: NORMAL

## 2019-04-19 PROCEDURE — G0432 EIA HIV-1/HIV-2 SCREEN: HCPCS | Performed by: NURSE PRACTITIONER

## 2019-04-19 PROCEDURE — 36415 COLL VENOUS BLD VENIPUNCTURE: CPT | Performed by: NURSE PRACTITIONER

## 2019-04-19 PROCEDURE — 99213 OFFICE O/P EST LOW 20 MIN: CPT | Performed by: NURSE PRACTITIONER

## 2019-04-19 PROCEDURE — 86803 HEPATITIS C AB TEST: CPT | Performed by: NURSE PRACTITIONER

## 2019-04-19 RX ORDER — MONTELUKAST SODIUM 10 MG/1
10 TABLET ORAL DAILY
Qty: 30 TABLET | Refills: 10 | Status: SHIPPED | OUTPATIENT
Start: 2019-04-19 | End: 2022-08-05 | Stop reason: SDUPTHER

## 2019-04-19 NOTE — PROGRESS NOTES
Subjective   Rush Ruiz is a 38 y.o. male.     He presents with a rash to R wrist x 1 day.  The rash appeared after pulling weeds in the yard. He has a small red, itchy patch to his R wrist. He also reports needing a refill on his singulair.      Rash   This is a new problem. The current episode started yesterday. The rash is characterized by redness, swelling and itchiness. He was exposed to plant contact. Pertinent negatives include no congestion, cough, diarrhea, fatigue, fever, joint pain, nail changes, shortness of breath or sore throat. Treatments tried: calamine lotion. The treatment provided mild relief.        The following portions of the patient's history were reviewed and updated as appropriate: allergies, current medications, past family history, past medical history, past social history, past surgical history and problem list.    Review of Systems   Constitutional: Negative for fatigue and fever.   HENT: Negative for congestion and sore throat.    Respiratory: Negative for cough and shortness of breath.    Gastrointestinal: Negative for abdominal pain, diarrhea and nausea.   Musculoskeletal: Negative for arthralgias, joint pain, joint swelling and myalgias.   Skin: Positive for rash. Negative for color change, nail changes and wound.   Neurological: Negative for weakness.       Objective   Physical Exam   Constitutional: He appears well-developed and well-nourished.   HENT:   Head: Normocephalic and atraumatic.   Cardiovascular: Normal rate, regular rhythm and normal heart sounds.   No murmur heard.  Pulmonary/Chest: Effort normal and breath sounds normal. No respiratory distress.   Musculoskeletal: Normal range of motion.   Neurological: He is alert.   Skin: Skin is warm and dry. Rash noted. Rash is maculopapular and vesicular.        Psychiatric: He has a normal mood and affect. His behavior is normal. Judgment and thought content normal.   Vitals reviewed.      Assessment/Plan   Rush was seen  today for rash.    Diagnoses and all orders for this visit:    Contact dermatitis due to plant  -     triamcinolone (KENALOG) 0.1 % ointment; Apply  topically to the appropriate area as directed 2 (Two) Times a Day.    Mild persistent asthma without complication  -     montelukast (SINGULAIR) 10 MG tablet; Take 1 tablet by mouth Daily.      Use cold compress for sx relief. Return for worsening of sx.

## 2019-04-20 LAB
HSV1 IGG SER IA-ACNC: 24.4 INDEX (ref 0–0.9)
HSV2 IGG SER IA-ACNC: <0.91 INDEX (ref 0–0.9)
RPR SER QL: NON REACTIVE

## 2019-06-20 NOTE — TELEPHONE ENCOUNTER
----- Message from Terese Arciniega sent at 3/18/2019  9:53 AM EDT -----  Contact: Pt   Pt would like to know if he should be fasting, for next appointment 3/27 as he is on new cholesterol medication as well as he would like a  Routine STD panel.    I advised him to fast just incase since it is a morning appointment.  Pt#[phone]    Offered and patient declined

## 2019-08-01 DIAGNOSIS — F41.9 ANXIETY: ICD-10-CM

## 2019-08-01 NOTE — TELEPHONE ENCOUNTER
----- Message from Terese Arciniega sent at 8/1/2019  9:38 AM EDT -----  Contact: pt  Pt is calling for a refill     FOR  ALPRAZolam (XANAX) 0.25 MG tablet      Patient requests RX SENT TO   [facility] AT [address] [place] [address] [phone] [place] [phone] FX      Caller# [phone]     Please and thank you.

## 2019-08-02 RX ORDER — ALPRAZOLAM 0.25 MG/1
0.25 TABLET ORAL 3 TIMES DAILY PRN
Qty: 45 TABLET | Refills: 0 | Status: SHIPPED | OUTPATIENT
Start: 2019-08-02 | End: 2020-03-18 | Stop reason: SDUPTHER

## 2019-10-01 ENCOUNTER — OFFICE VISIT (OUTPATIENT)
Dept: INTERNAL MEDICINE | Facility: CLINIC | Age: 39
End: 2019-10-01

## 2019-10-01 VITALS
OXYGEN SATURATION: 98 % | HEART RATE: 48 BPM | DIASTOLIC BLOOD PRESSURE: 88 MMHG | HEIGHT: 72 IN | BODY MASS INDEX: 22.48 KG/M2 | WEIGHT: 166 LBS | SYSTOLIC BLOOD PRESSURE: 120 MMHG

## 2019-10-01 DIAGNOSIS — E78.00 HYPERCHOLESTEREMIA: Primary | Chronic | ICD-10-CM

## 2019-10-01 DIAGNOSIS — H35.713 CENTRAL SEROUS CHORIORETINOPATHY OF BOTH EYES: Chronic | ICD-10-CM

## 2019-10-01 DIAGNOSIS — J45.30 MILD PERSISTENT ASTHMA WITHOUT COMPLICATION: ICD-10-CM

## 2019-10-01 DIAGNOSIS — Z82.49 FAMILY HISTORY OF CARDIOVASCULAR DISEASE: Chronic | ICD-10-CM

## 2019-10-01 LAB
ALBUMIN SERPL-MCNC: 4.7 G/DL (ref 3.5–5.2)
ALBUMIN/GLOB SERPL: 2 G/DL
ALP SERPL-CCNC: 36 U/L (ref 39–117)
ALT SERPL W P-5'-P-CCNC: 15 U/L (ref 1–41)
ANION GAP SERPL CALCULATED.3IONS-SCNC: 11.4 MMOL/L (ref 5–15)
AST SERPL-CCNC: 16 U/L (ref 1–40)
BILIRUB SERPL-MCNC: 0.4 MG/DL (ref 0.2–1.2)
BUN BLD-MCNC: 9 MG/DL (ref 6–20)
BUN/CREAT SERPL: 9.4 (ref 7–25)
CALCIUM SPEC-SCNC: 9.1 MG/DL (ref 8.6–10.5)
CHLORIDE SERPL-SCNC: 101 MMOL/L (ref 98–107)
CHOLEST SERPL-MCNC: 192 MG/DL (ref 0–200)
CO2 SERPL-SCNC: 26.6 MMOL/L (ref 22–29)
CREAT BLD-MCNC: 0.96 MG/DL (ref 0.76–1.27)
GFR SERPL CREATININE-BSD FRML MDRD: 87 ML/MIN/1.73
GLOBULIN UR ELPH-MCNC: 2.4 GM/DL
GLUCOSE BLD-MCNC: 94 MG/DL (ref 65–99)
HDLC SERPL-MCNC: 69 MG/DL (ref 40–60)
LDLC SERPL CALC-MCNC: 110 MG/DL (ref 0–100)
LDLC/HDLC SERPL: 1.6 {RATIO}
POTASSIUM BLD-SCNC: 4.7 MMOL/L (ref 3.5–5.2)
PROT SERPL-MCNC: 7.1 G/DL (ref 6–8.5)
SODIUM BLD-SCNC: 139 MMOL/L (ref 136–145)
TRIGL SERPL-MCNC: 64 MG/DL (ref 0–150)
VLDLC SERPL-MCNC: 12.8 MG/DL (ref 5–40)

## 2019-10-01 PROCEDURE — 99214 OFFICE O/P EST MOD 30 MIN: CPT | Performed by: INTERNAL MEDICINE

## 2019-10-01 PROCEDURE — 80053 COMPREHEN METABOLIC PANEL: CPT | Performed by: INTERNAL MEDICINE

## 2019-10-01 PROCEDURE — 80061 LIPID PANEL: CPT | Performed by: INTERNAL MEDICINE

## 2019-10-01 NOTE — PROGRESS NOTES
Subjective   Rush Ruiz is a 38 y.o. male. Presents with a chief complaint of hyperlipidemia, mild asthma, allergic rhinitis, chorioretinopathy of both eyes here for follow up and evaluation. He is doing well overall.    History of Present Illness doing well overall    The following portions of the patient's history were reviewed and updated as appropriate: allergies, current medications, past family history, past medical history, past social history, past surgical history and problem list.    Review of Systems   Constitutional: Negative.    HENT: Negative.    Eyes: Negative.    Respiratory: Negative.    Cardiovascular: Negative.    Gastrointestinal: Negative.    Endocrine: Negative.    Genitourinary: Negative.    Musculoskeletal: Negative.    Skin: Negative.    Allergic/Immunologic: Negative.    Neurological: Negative.    Hematological: Negative.    Psychiatric/Behavioral: Negative.        Objective   Physical Exam   Constitutional: He is oriented to person, place, and time. He appears well-developed and well-nourished.   HENT:   Head: Normocephalic and atraumatic.   Eyes: EOM are normal. Pupils are equal, round, and reactive to light.   Neck: Normal range of motion. Neck supple.   Cardiovascular: Normal rate, regular rhythm and normal heart sounds.   Pulmonary/Chest: Effort normal and breath sounds normal.   Abdominal: Soft. Bowel sounds are normal.   Musculoskeletal: Normal range of motion.   Neurological: He is alert and oriented to person, place, and time.   Skin: Skin is warm.   Psychiatric: He has a normal mood and affect.   Nursing note and vitals reviewed.        Assessment/Plan   Rush was seen today for hyperlipidemia and insomnia.    Diagnoses and all orders for this visit:    Hypercholesteremia  Comments:  check a Lipid levels  Orders:  -     Comprehensive metabolic panel; Future  -     Lipid panel; Future    Central serous chorioretinopathy of both eyes  Comments:  stable    Family history of  cardiovascular disease  Comments:  doing well    Mild persistent asthma without complication  Comments:  doing very well currently

## 2019-11-12 DIAGNOSIS — E78.00 HYPERCHOLESTEREMIA: ICD-10-CM

## 2019-11-12 RX ORDER — ROSUVASTATIN CALCIUM 5 MG/1
TABLET, COATED ORAL
Qty: 30 TABLET | Refills: 4 | Status: SHIPPED | OUTPATIENT
Start: 2019-11-12 | End: 2020-06-02

## 2019-11-21 RX ORDER — DILTIAZEM HYDROCHLORIDE 60 MG/1
TABLET, FILM COATED ORAL
Qty: 10.2 G | Refills: 1 | Status: SHIPPED | OUTPATIENT
Start: 2019-11-21 | End: 2021-03-05

## 2020-03-18 ENCOUNTER — TELEPHONE (OUTPATIENT)
Dept: INTERNAL MEDICINE | Facility: CLINIC | Age: 40
End: 2020-03-18

## 2020-03-18 DIAGNOSIS — F51.01 PRIMARY INSOMNIA: Primary | ICD-10-CM

## 2020-03-18 DIAGNOSIS — F41.9 ANXIETY: ICD-10-CM

## 2020-03-18 RX ORDER — TRAZODONE HYDROCHLORIDE 50 MG/1
50 TABLET ORAL
Qty: 30 TABLET | Refills: 1 | Status: SHIPPED | OUTPATIENT
Start: 2020-03-18 | End: 2020-07-07

## 2020-03-18 RX ORDER — ALPRAZOLAM 0.25 MG/1
0.25 TABLET ORAL 3 TIMES DAILY PRN
Qty: 45 TABLET | Refills: 0 | Status: SHIPPED | OUTPATIENT
Start: 2020-03-18 | End: 2021-07-08 | Stop reason: SDUPTHER

## 2020-03-18 NOTE — TELEPHONE ENCOUNTER
PATIENT CALLED TO REQUEST A REFILL ON traZODone (DESYREL) 50 MG tablet AND ALPRAZolam (XANAX) 0.25 MG tablet      PLEASE SEND RX TO [facility] AT [address] [facility] [phone] [facility] [phone] FX

## 2020-06-02 DIAGNOSIS — E78.00 HYPERCHOLESTEREMIA: ICD-10-CM

## 2020-06-02 RX ORDER — ROSUVASTATIN CALCIUM 5 MG/1
TABLET, COATED ORAL
Qty: 30 TABLET | Refills: 3 | Status: SHIPPED | OUTPATIENT
Start: 2020-06-02 | End: 2020-12-07

## 2020-06-26 ENCOUNTER — TELEPHONE (OUTPATIENT)
Dept: INTERNAL MEDICINE | Facility: CLINIC | Age: 40
End: 2020-06-26

## 2020-06-26 NOTE — TELEPHONE ENCOUNTER
PATIENT WANTED TO KNOW IF HE SHOULD COME IN FOR HIS BLOOD WORK  .  PATIENT ALSO WANTED TO KNOW IF HE CAN GET ORDERS FOR A COVID-19 ANTI BODY TEST .  AFTER EXPOSURE  HOW LONG AFTER SHOULD HE GET TESTED ? PATIENT WANTS TO KNOW ABOUT THE INCUBATION PERIOD ?  BASED ON PATIENTS MEDICAL HISTORY HE WANTS TO KNOW THE RISK FACTOR CONCERNING COVID -19         PLEASE CALL PATIENT AND ADVISE [phone].

## 2020-06-29 NOTE — TELEPHONE ENCOUNTER
Yes, he is due for a f/u appt with fasting labs-please schedule. We will also discuss COVID-19 antibody testing at that time. Thanks.

## 2020-07-07 ENCOUNTER — OFFICE VISIT (OUTPATIENT)
Dept: INTERNAL MEDICINE | Facility: CLINIC | Age: 40
End: 2020-07-07

## 2020-07-07 VITALS
RESPIRATION RATE: 14 BRPM | SYSTOLIC BLOOD PRESSURE: 110 MMHG | WEIGHT: 170 LBS | BODY MASS INDEX: 23.03 KG/M2 | DIASTOLIC BLOOD PRESSURE: 74 MMHG | HEIGHT: 72 IN

## 2020-07-07 DIAGNOSIS — Z20.822 SUSPECTED COVID-19 VIRUS INFECTION: ICD-10-CM

## 2020-07-07 DIAGNOSIS — J45.40 MODERATE PERSISTENT ASTHMA WITHOUT COMPLICATION: ICD-10-CM

## 2020-07-07 DIAGNOSIS — F51.01 PRIMARY INSOMNIA: ICD-10-CM

## 2020-07-07 DIAGNOSIS — J30.9 ALLERGIC RHINITIS, UNSPECIFIED SEASONALITY, UNSPECIFIED TRIGGER: ICD-10-CM

## 2020-07-07 DIAGNOSIS — E78.00 HYPERCHOLESTEREMIA: Primary | ICD-10-CM

## 2020-07-07 PROBLEM — Q16.1: Status: ACTIVE | Noted: 2019-06-14

## 2020-07-07 PROCEDURE — 99214 OFFICE O/P EST MOD 30 MIN: CPT | Performed by: NURSE PRACTITIONER

## 2020-07-07 RX ORDER — TRAZODONE HYDROCHLORIDE 50 MG/1
50 TABLET ORAL NIGHTLY PRN
Qty: 30 TABLET | Refills: 1
Start: 2020-07-07

## 2020-07-08 PROBLEM — R06.09 DOE (DYSPNEA ON EXERTION): Status: RESOLVED | Noted: 2017-10-20 | Resolved: 2020-07-08

## 2020-07-08 PROBLEM — F51.01 PRIMARY INSOMNIA: Status: ACTIVE | Noted: 2020-07-08

## 2020-07-08 PROBLEM — J30.9 ALLERGIC RHINITIS: Status: ACTIVE | Noted: 2020-07-08

## 2020-07-08 LAB
ALBUMIN SERPL-MCNC: 4.8 G/DL (ref 3.5–5.2)
ALBUMIN/GLOB SERPL: 2.3 G/DL
ALP SERPL-CCNC: 39 U/L (ref 39–117)
ALT SERPL-CCNC: 16 U/L (ref 1–41)
AST SERPL-CCNC: 17 U/L (ref 1–40)
BASOPHILS # BLD AUTO: 0.04 10*3/MM3 (ref 0–0.2)
BASOPHILS NFR BLD AUTO: 1 % (ref 0–1.5)
BILIRUB SERPL-MCNC: 0.5 MG/DL (ref 0–1.2)
BUN SERPL-MCNC: 13 MG/DL (ref 6–20)
BUN/CREAT SERPL: 14.8 (ref 7–25)
CALCIUM SERPL-MCNC: 9.4 MG/DL (ref 8.6–10.5)
CHLORIDE SERPL-SCNC: 102 MMOL/L (ref 98–107)
CHOLEST SERPL-MCNC: 168 MG/DL (ref 0–200)
CO2 SERPL-SCNC: 28.1 MMOL/L (ref 22–29)
CREAT SERPL-MCNC: 0.88 MG/DL (ref 0.76–1.27)
EOSINOPHIL # BLD AUTO: 0.08 10*3/MM3 (ref 0–0.4)
EOSINOPHIL NFR BLD AUTO: 2.1 % (ref 0.3–6.2)
ERYTHROCYTE [DISTWIDTH] IN BLOOD BY AUTOMATED COUNT: 12.5 % (ref 12.3–15.4)
GLOBULIN SER CALC-MCNC: 2.1 GM/DL
GLUCOSE SERPL-MCNC: 95 MG/DL (ref 65–99)
HCT VFR BLD AUTO: 42.1 % (ref 37.5–51)
HDLC SERPL-MCNC: 65 MG/DL (ref 40–60)
HGB BLD-MCNC: 14.5 G/DL (ref 13–17.7)
IMM GRANULOCYTES # BLD AUTO: 0.01 10*3/MM3 (ref 0–0.05)
IMM GRANULOCYTES NFR BLD AUTO: 0.3 % (ref 0–0.5)
LDLC SERPL CALC-MCNC: 90 MG/DL (ref 0–100)
LYMPHOCYTES # BLD AUTO: 1.99 10*3/MM3 (ref 0.7–3.1)
LYMPHOCYTES NFR BLD AUTO: 52 % (ref 19.6–45.3)
MCH RBC QN AUTO: 31.6 PG (ref 26.6–33)
MCHC RBC AUTO-ENTMCNC: 34.4 G/DL (ref 31.5–35.7)
MCV RBC AUTO: 91.7 FL (ref 79–97)
MONOCYTES # BLD AUTO: 0.28 10*3/MM3 (ref 0.1–0.9)
MONOCYTES NFR BLD AUTO: 7.3 % (ref 5–12)
NEUTROPHILS # BLD AUTO: 1.43 10*3/MM3 (ref 1.7–7)
NEUTROPHILS NFR BLD AUTO: 37.3 % (ref 42.7–76)
NRBC BLD AUTO-RTO: 0 /100 WBC (ref 0–0.2)
PLATELET # BLD AUTO: 195 10*3/MM3 (ref 140–450)
POTASSIUM SERPL-SCNC: 4.6 MMOL/L (ref 3.5–5.2)
PROT SERPL-MCNC: 6.9 G/DL (ref 6–8.5)
RBC # BLD AUTO: 4.59 10*6/MM3 (ref 4.14–5.8)
SARS-COV-2 AB SERPL QL IA: NEGATIVE
SODIUM SERPL-SCNC: 140 MMOL/L (ref 136–145)
TRIGL SERPL-MCNC: 63 MG/DL (ref 0–150)
VLDLC SERPL CALC-MCNC: 12.6 MG/DL
WBC # BLD AUTO: 3.83 10*3/MM3 (ref 3.4–10.8)

## 2020-07-08 NOTE — PROGRESS NOTES
Subjective   Rush Ruiz is a 39 y.o. male who presents for f/u regarding hyperlipidemia, allergies and insomnia.    He is now receiving allergy injections and has done very well with management of allergies.  He is using Symbicort and Singulair daily with infrequent albuterol use.  He does report improvement in his anxiety since his last visit.  He is using trazodone infrequently for management of insomnia.  He did have COVID testing done June 27 which was negative.  He is requesting antibody testing due to an illness in March that has since resolved.    Hyperlipidemia   This is a chronic problem. The current episode started more than 1 year ago. The problem is controlled. Recent lipid tests were reviewed and are low. He has no history of diabetes or hypothyroidism. Pertinent negatives include no chest pain. Current antihyperlipidemic treatment includes statins. The current treatment provides significant improvement of lipids. There are no compliance problems.    Allergies   Associated symptoms include congestion. Pertinent negatives include no abdominal pain, arthralgias, chest pain, chills, coughing, fatigue, fever, headaches, joint swelling, nausea, sore throat, vomiting or weakness.   Insomnia   Associated symptoms include congestion. Pertinent negatives include no abdominal pain, arthralgias, chest pain, chills, coughing, fatigue, fever, headaches, joint swelling, nausea, sore throat, vomiting or weakness.        The following portions of the patient's history were reviewed and updated as appropriate: allergies, current medications, past social history and problem list.    Past Medical History:   Diagnosis Date   • Allergic rhinitis    • Anxiety    • Asthma     intermittent asthma without complications.   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 Rose  Edwin         Current Outpatient Medications:   •  albuterol (PROVENTIL HFA;VENTOLIN HFA) 108 (90 BASE) MCG/ACT inhaler, Inhale 2 puffs Every 4 (Four) Hours As Needed for Wheezing., Disp: 1 inhaler, Rfl: 3  •  ALPRAZolam (Xanax) 0.25 MG tablet, Take 1 tablet by mouth 3 (Three) Times a Day As Needed for Anxiety., Disp: 45 tablet, Rfl: 0  •  Bilberry, Vaccinium myrtillus, 60 MG capsule, Take 280 mg by mouth., Disp: , Rfl:   •  montelukast (SINGULAIR) 10 MG tablet, Take 1 tablet by mouth Daily., Disp: 30 tablet, Rfl: 10  •  MULTIPLE VITAMINS-MINERALS PO, Take 1 capsule by mouth., Disp: , Rfl:   •  rosuvastatin (CRESTOR) 5 MG tablet, TAKE ONE TABLET BY MOUTH DAILY, Disp: 30 tablet, Rfl: 3  •  SYMBICORT 80-4.5 MCG/ACT inhaler, INHALE TWO PUFFS BY MOUTH TWICE A DAY --**RINSE MOUTH AND SPIT AFTER USE**, Disp: 10.2 g, Rfl: 1  •  traZODone (DESYREL) 50 MG tablet, Take 1 tablet by mouth At Night As Needed for Sleep., Disp: 30 tablet, Rfl: 1  •  Unable to find, Take 1 capsule by mouth., Disp: , Rfl:     No Known Allergies    Review of Systems   Constitutional: Negative for chills, fatigue, fever and unexpected weight change.   HENT: Positive for congestion and postnasal drip. Negative for ear pain, sinus pressure, sore throat and trouble swallowing.    Eyes: Negative for visual disturbance.   Respiratory: Negative for cough, chest tightness and wheezing.    Cardiovascular: Negative for chest pain, palpitations and leg swelling.   Gastrointestinal: Negative for abdominal pain, blood in stool, nausea and vomiting.   Genitourinary: Negative for dysuria, frequency and urgency.   Musculoskeletal: Negative for arthralgias and joint swelling.   Skin: Negative for color change.   Neurological: Negative for syncope, weakness and headaches.   Hematological: Does not bruise/bleed easily.   Psychiatric/Behavioral: Positive for sleep disturbance. The patient has insomnia.        Objective   Vitals:    07/07/20 1025   BP: 110/74   BP  "Location: Left arm   Patient Position: Sitting   Cuff Size: Adult   Resp: 14   Weight: 77.1 kg (170 lb)   Height: 182.9 cm (72\")     Body mass index is 23.06 kg/m².  Physical Exam   Constitutional: He appears well-developed and well-nourished. He is cooperative. He does not have a sickly appearance. He does not appear ill.   HENT:   Head: Normocephalic.   Right Ear: Hearing, tympanic membrane and external ear normal.   Nose: Nose normal. No mucosal edema, rhinorrhea, sinus tenderness or nasal deformity. Right sinus exhibits no maxillary sinus tenderness and no frontal sinus tenderness. Left sinus exhibits no maxillary sinus tenderness and no frontal sinus tenderness.   Mouth/Throat: Oropharynx is clear and moist and mucous membranes are normal. Normal dentition.   Atresia of left ear   Eyes: Conjunctivae and lids are normal. Right eye exhibits no discharge and no exudate. Left eye exhibits no discharge and no exudate.   Neck: Trachea normal. Carotid bruit is not present. No edema present. No thyroid mass present.   Cardiovascular: Regular rhythm, normal heart sounds and normal pulses.   No murmur heard.  Pulmonary/Chest: Breath sounds normal. No respiratory distress. He has no decreased breath sounds. He has no wheezes. He has no rhonchi. He has no rales.   Abdominal: Soft. Bowel sounds are normal. There is no tenderness.   Lymphadenopathy:        Head (right side): No submental, no submandibular, no tonsillar, no preauricular, no posterior auricular and no occipital adenopathy present.        Head (left side): No submental, no submandibular, no tonsillar, no preauricular, no posterior auricular and no occipital adenopathy present.   Neurological: He is alert.   Skin: Skin is warm, dry and intact. No cyanosis. Nails show no clubbing.       Assessment/Plan   Rush was seen today for hyperlipidemia, allergies and insomnia.    Diagnoses and all orders for this visit:    Hypercholesteremia  -     CBC & Differential  -   "   Comprehensive Metabolic Panel  -     Lipid Panel    Primary insomnia  -     traZODone (DESYREL) 50 MG tablet; Take 1 tablet by mouth At Night As Needed for Sleep.    Moderate persistent asthma without complication    Allergic rhinitis, unspecified seasonality, unspecified trigger    Suspected COVID-19 virus infection  -     SARS-CoV-2 Antibodies (Roche); Future  -     SARS-CoV-2 Antibodies (Roche)    1.  He is tolerating Crestor well, check lipid panel today.  2.  He is doing well with trazodone as needed for insomnia.  3&4.  He takes Symbicort and Singulair daily with infrequent albuterol use.  He is also receiving allergy injections.  5.  He has requested antibody testing which we discussed today including the limitations of testing.

## 2020-10-18 ENCOUNTER — DOCUMENTATION (OUTPATIENT)
Dept: GASTROENTEROLOGY | Facility: CLINIC | Age: 40
End: 2020-10-18

## 2020-10-18 RX ORDER — METRONIDAZOLE 500 MG/1
500 TABLET ORAL 3 TIMES DAILY
Qty: 30 TABLET | Refills: 0 | Status: SHIPPED | OUTPATIENT
Start: 2020-10-18 | End: 2020-10-28

## 2020-12-07 DIAGNOSIS — E78.00 HYPERCHOLESTEREMIA: ICD-10-CM

## 2020-12-07 RX ORDER — ROSUVASTATIN CALCIUM 5 MG/1
TABLET, COATED ORAL
Qty: 90 TABLET | Refills: 1 | Status: SHIPPED | OUTPATIENT
Start: 2020-12-07 | End: 2021-10-12

## 2021-01-08 ENCOUNTER — OFFICE VISIT (OUTPATIENT)
Dept: INTERNAL MEDICINE | Facility: CLINIC | Age: 41
End: 2021-01-08

## 2021-01-08 VITALS
SYSTOLIC BLOOD PRESSURE: 100 MMHG | HEIGHT: 72 IN | BODY MASS INDEX: 23.3 KG/M2 | WEIGHT: 172 LBS | DIASTOLIC BLOOD PRESSURE: 72 MMHG | OXYGEN SATURATION: 98 % | TEMPERATURE: 97.2 F | HEART RATE: 56 BPM

## 2021-01-08 DIAGNOSIS — J45.40 MODERATE PERSISTENT ASTHMA WITHOUT COMPLICATION: ICD-10-CM

## 2021-01-08 DIAGNOSIS — F51.01 PRIMARY INSOMNIA: ICD-10-CM

## 2021-01-08 DIAGNOSIS — J30.9 ALLERGIC RHINITIS, UNSPECIFIED SEASONALITY, UNSPECIFIED TRIGGER: Chronic | ICD-10-CM

## 2021-01-08 DIAGNOSIS — E78.00 HYPERCHOLESTEREMIA: Primary | ICD-10-CM

## 2021-01-08 PROBLEM — Z79.899 CONTROLLED SUBSTANCE AGREEMENT SIGNED: Status: ACTIVE | Noted: 2021-01-08

## 2021-01-08 LAB
ALBUMIN SERPL-MCNC: 4.7 G/DL (ref 3.5–5.2)
ALBUMIN/GLOB SERPL: 2 G/DL
ALP SERPL-CCNC: 43 U/L (ref 39–117)
ALT SERPL-CCNC: 24 U/L (ref 1–41)
AST SERPL-CCNC: 29 U/L (ref 1–40)
BASOPHILS # BLD AUTO: 0.04 10*3/MM3 (ref 0–0.2)
BASOPHILS NFR BLD AUTO: 0.9 % (ref 0–1.5)
BILIRUB SERPL-MCNC: 0.4 MG/DL (ref 0–1.2)
BUN SERPL-MCNC: 18 MG/DL (ref 6–20)
BUN/CREAT SERPL: 20.5 (ref 7–25)
CALCIUM SERPL-MCNC: 9.4 MG/DL (ref 8.6–10.5)
CHLORIDE SERPL-SCNC: 103 MMOL/L (ref 98–107)
CHOLEST SERPL-MCNC: 158 MG/DL (ref 0–200)
CO2 SERPL-SCNC: 29.4 MMOL/L (ref 22–29)
CREAT SERPL-MCNC: 0.88 MG/DL (ref 0.76–1.27)
EOSINOPHIL # BLD AUTO: 0.08 10*3/MM3 (ref 0–0.4)
EOSINOPHIL NFR BLD AUTO: 1.8 % (ref 0.3–6.2)
ERYTHROCYTE [DISTWIDTH] IN BLOOD BY AUTOMATED COUNT: 12.7 % (ref 12.3–15.4)
GLOBULIN SER CALC-MCNC: 2.3 GM/DL
GLUCOSE SERPL-MCNC: 85 MG/DL (ref 65–99)
HCT VFR BLD AUTO: 44.9 % (ref 37.5–51)
HDLC SERPL-MCNC: 67 MG/DL (ref 40–60)
HGB BLD-MCNC: 15.2 G/DL (ref 13–17.7)
IMM GRANULOCYTES # BLD AUTO: 0.01 10*3/MM3 (ref 0–0.05)
IMM GRANULOCYTES NFR BLD AUTO: 0.2 % (ref 0–0.5)
LDLC SERPL CALC-MCNC: 82 MG/DL (ref 0–100)
LYMPHOCYTES # BLD AUTO: 2.16 10*3/MM3 (ref 0.7–3.1)
LYMPHOCYTES NFR BLD AUTO: 49.9 % (ref 19.6–45.3)
MCH RBC QN AUTO: 30.6 PG (ref 26.6–33)
MCHC RBC AUTO-ENTMCNC: 33.9 G/DL (ref 31.5–35.7)
MCV RBC AUTO: 90.3 FL (ref 79–97)
MONOCYTES # BLD AUTO: 0.35 10*3/MM3 (ref 0.1–0.9)
MONOCYTES NFR BLD AUTO: 8.1 % (ref 5–12)
NEUTROPHILS # BLD AUTO: 1.69 10*3/MM3 (ref 1.7–7)
NEUTROPHILS NFR BLD AUTO: 39.1 % (ref 42.7–76)
NRBC BLD AUTO-RTO: 0 /100 WBC (ref 0–0.2)
PLATELET # BLD AUTO: 212 10*3/MM3 (ref 140–450)
POTASSIUM SERPL-SCNC: 4.7 MMOL/L (ref 3.5–5.2)
PROT SERPL-MCNC: 7 G/DL (ref 6–8.5)
RBC # BLD AUTO: 4.97 10*6/MM3 (ref 4.14–5.8)
SODIUM SERPL-SCNC: 141 MMOL/L (ref 136–145)
TRIGL SERPL-MCNC: 39 MG/DL (ref 0–150)
VLDLC SERPL CALC-MCNC: 9 MG/DL (ref 5–40)
WBC # BLD AUTO: 4.33 10*3/MM3 (ref 3.4–10.8)

## 2021-01-08 PROCEDURE — 99214 OFFICE O/P EST MOD 30 MIN: CPT | Performed by: NURSE PRACTITIONER

## 2021-01-08 NOTE — PROGRESS NOTES
"Chief Complaint  Hyperlipidemia, Asthma, and Insomnia    Subjective          Rush Ruiz presents to [facility] for f/u regarding hyperlipidemia, asthma and insomnia.    He continues on allergy injections every 3 weeks with good management of nasal congestion and drainage, also uses Symbicort for mgmt of asthma with good results. He reports infrequent albuterol use.  He was treated 10/18/2020 with Flagyl for presumed colitis which has since resolved with no further problems, denies rectal bleeding.  He c/o low back pain and stiffness which he attributes to his recent move, denies radicular symptoms.  He takes Trazodone very rarely for improved sleep which has been helpful.    Hyperlipidemia  This is a chronic problem. The current episode started more than 1 year ago. The problem is controlled. Recent lipid tests were reviewed and are low. He has no history of diabetes or hypothyroidism. Current antihyperlipidemic treatment includes statins. The current treatment provides significant improvement of lipids. There are no compliance problems.        Objective   Vital Signs:   /72 (BP Location: Left arm, Patient Position: Sitting, Cuff Size: Adult)   Pulse 56   Temp 97.2 °F (36.2 °C) (Oral)   Ht 182.9 cm (72\")   Wt 78 kg (172 lb)   SpO2 98%   BMI 23.33 kg/m²     Physical Exam  Constitutional:       Appearance: He is well-developed. He is not ill-appearing.   HENT:      Head: Normocephalic.      Right Ear: Hearing, tympanic membrane and external ear normal.      Ears:      Comments: Atresia left ear     Nose: Nose normal. No nasal deformity, mucosal edema or rhinorrhea.      Right Sinus: No maxillary sinus tenderness or frontal sinus tenderness.      Left Sinus: No maxillary sinus tenderness or frontal sinus tenderness.      Mouth/Throat:      Dentition: Normal dentition.   Eyes:      General: Lids are normal.         Right eye: No discharge.         Left eye: No discharge. "      Conjunctiva/sclera: Conjunctivae normal.      Right eye: No exudate.     Left eye: No exudate.  Neck:      Musculoskeletal: Normal range of motion. No edema.      Thyroid: No thyroid mass or thyromegaly.      Vascular: No carotid bruit.      Trachea: Trachea normal.   Cardiovascular:      Rate and Rhythm: Regular rhythm.      Pulses: Normal pulses.      Heart sounds: Normal heart sounds. No murmur.   Pulmonary:      Effort: No respiratory distress.      Breath sounds: Normal breath sounds. No decreased breath sounds, wheezing, rhonchi or rales.   Abdominal:      General: Bowel sounds are normal.      Palpations: Abdomen is soft.      Tenderness: There is no abdominal tenderness.   Lymphadenopathy:      Head:      Right side of head: No submental, submandibular, tonsillar, preauricular, posterior auricular or occipital adenopathy.      Left side of head: No submental, submandibular, tonsillar, preauricular, posterior auricular or occipital adenopathy.   Skin:     General: Skin is warm and dry.      Nails: There is no clubbing.     Neurological:      Mental Status: He is alert.   Psychiatric:         Behavior: Behavior is cooperative.        Result Review :   The following data was reviewed by: WESLEY Macdonald on 01/08/2021:  Common labs    Common Labsle 7/7/20 7/7/20 7/7/20 1/8/21 1/8/21 1/8/21    1141 1141 1141 1027 1027 1027   Glucose  95   85    BUN  13   18    Creatinine  0.88   0.88    eGFR Non  Am  95   95    eGFR  Am  116   115    Sodium  140   141    Potassium  4.6   4.7    Chloride  102   103    Calcium  9.4   9.4    Total Protein  6.9   7.0    Albumin  4.80   4.70    Total Bilirubin  0.5   0.4    Alkaline Phosphatase  39   43    AST (SGOT)  17   29    ALT (SGPT)  16   24    WBC 3.83   4.33     Hemoglobin 14.5   15.2     Hematocrit 42.1   44.9     Platelets 195   212     Total Cholesterol   168   158   Triglycerides   63   39   HDL Cholesterol   65 (A)   67 (A)   LDL Cholesterol     90   82   (A) Abnormal value       Comments are available for some flowsheets but are not being displayed.                     Assessment and Plan    Problem List Items Addressed This Visit        Allergies and Adverse Reactions    Allergic rhinitis (Chronic)    Current Assessment & Plan     He is receiving allergy injections q3 weeks and is doing well            Cardiac and Vasculature    Hypercholesteremia - Primary (Chronic)    Current Assessment & Plan     Lipid abnormalities are unchanged.  Pharmacotherapy as ordered.  Lipids will be reassessed in 6 months.  He is a vegetarian and follows a low-fat, low-cholesterol diet.         Relevant Orders    CBC & Differential (Completed)    Comprehensive Metabolic Panel (Completed)    Lipid Panel (Completed)       Pulmonary and Pneumonias    Moderate persistent asthma without complication (Chronic)    Current Assessment & Plan     Asthma is unchanged.  The patient is experiencing no daytime asthma symptoms. He is experiencing no nighttime asthma symptoms.  continue Symbicort as needed                Sleep    Primary insomnia (Chronic)    Current Assessment & Plan     He takes Trazodone very rarely with good results               Follow Up   Return in about 6 months (around 7/8/2021).  Patient was given instructions and counseling regarding his condition or for health maintenance advice. Please see specific information pulled into the AVS if appropriate.

## 2021-01-11 PROBLEM — F51.01 PRIMARY INSOMNIA: Chronic | Status: ACTIVE | Noted: 2020-07-08

## 2021-01-11 PROBLEM — J30.9 ALLERGIC RHINITIS: Chronic | Status: ACTIVE | Noted: 2020-07-08

## 2021-01-11 NOTE — ASSESSMENT & PLAN NOTE
Asthma is unchanged.  The patient is experiencing no daytime asthma symptoms. He is experiencing no nighttime asthma symptoms.  continue Symbicort as needed

## 2021-01-11 NOTE — ASSESSMENT & PLAN NOTE
Lipid abnormalities are unchanged.  Pharmacotherapy as ordered.  Lipids will be reassessed in 6 months.  He is a vegetarian and follows a low-fat, low-cholesterol diet.

## 2021-03-07 RX ORDER — DILTIAZEM HYDROCHLORIDE 60 MG/1
TABLET, FILM COATED ORAL
Qty: 10.2 G | Refills: 0 | Status: SHIPPED | OUTPATIENT
Start: 2021-03-07 | End: 2021-04-21

## 2021-04-20 DIAGNOSIS — J45.40 MODERATE PERSISTENT ASTHMA WITHOUT COMPLICATION: Primary | ICD-10-CM

## 2021-04-21 RX ORDER — BUDESONIDE AND FORMOTEROL FUMARATE DIHYDRATE 80; 4.5 UG/1; UG/1
AEROSOL RESPIRATORY (INHALATION)
Qty: 10.2 G | Refills: 1 | Status: SHIPPED | OUTPATIENT
Start: 2021-04-21 | End: 2022-08-05 | Stop reason: SDUPTHER

## 2021-05-28 ENCOUNTER — TELEPHONE (OUTPATIENT)
Dept: INTERNAL MEDICINE | Facility: CLINIC | Age: 41
End: 2021-05-28

## 2021-05-28 NOTE — TELEPHONE ENCOUNTER
Caller: Rush Ruiz    Relationship: Self    Best call back number: [phone]    What is the best time to reach you: TODAY    Who are you requesting to speak with (clinical staff, provider,  specific staff member): CLINICAL      What was the call regarding: PATIENT WAS WANTING TO GET A REFERRAL FOR A SPORTS THERAPY APPOINTMENT FROM [facility] OR A RECOMMENDATION TO ONE.    Do you require a callback: YES

## 2021-06-07 ENCOUNTER — OFFICE VISIT (OUTPATIENT)
Dept: INTERNAL MEDICINE | Facility: CLINIC | Age: 41
End: 2021-06-07

## 2021-06-07 VITALS
TEMPERATURE: 98 F | OXYGEN SATURATION: 98 % | BODY MASS INDEX: 23.89 KG/M2 | WEIGHT: 176.4 LBS | DIASTOLIC BLOOD PRESSURE: 70 MMHG | SYSTOLIC BLOOD PRESSURE: 100 MMHG | HEIGHT: 72 IN | RESPIRATION RATE: 16 BRPM | HEART RATE: 55 BPM

## 2021-06-07 DIAGNOSIS — M54.50 ACUTE BILATERAL LOW BACK PAIN WITHOUT SCIATICA: ICD-10-CM

## 2021-06-07 DIAGNOSIS — M54.2 CHRONIC NECK PAIN: ICD-10-CM

## 2021-06-07 DIAGNOSIS — M41.25 OTHER IDIOPATHIC SCOLIOSIS, THORACOLUMBAR REGION: Primary | ICD-10-CM

## 2021-06-07 DIAGNOSIS — G89.29 CHRONIC NECK PAIN: ICD-10-CM

## 2021-06-07 PROCEDURE — 99214 OFFICE O/P EST MOD 30 MIN: CPT | Performed by: NURSE PRACTITIONER

## 2021-06-07 NOTE — PATIENT INSTRUCTIONS
Tinea Versicolor    Tinea versicolor is a common fungal infection of the skin. It causes a rash that appears as light or dark patches on the skin. The rash most often occurs on the chest, back, neck, or upper arms. This condition is more common during warm weather.  Other than affecting how your skin looks, tinea versicolor usually does not cause other problems. In most cases, the infection goes away in a few weeks with treatment. It may take a few months for the patches on your skin to return to your usual skin color.  What are the causes?  This condition occurs when a type of fungus that is normally present on the skin starts to overgrow. This fungus is a kind of yeast. The exact cause of the overgrowth is not known. This condition cannot be passed from one person to another (it is not contagious).  What increases the risk?  This condition is more likely to develop when certain factors are present, such as:  · Heat and humidity.  · Sweating too much.  · Hormone changes.  · Oily skin.  · A weak disease-fighting system (immunesystem).  What are the signs or symptoms?  Symptoms of this condition include:  · A rash of light or dark patches on your skin. The rash may have:  ? Patches of tan or pink spots (on light skin).  ? Patches of white or brown spots (on dark skin).  ? Patches of skin that do not tan.  ? Well-marked edges.  ? Scales on the discolored areas.  · Mild itching.  How is this diagnosed?  A health care provider can usually diagnose this condition by looking at your skin. During the exam, he or she may use ultraviolet (UV) light to see how much of your skin has been affected. In some cases, a skin sample may be taken by scraping the rash. This sample will be viewed under a microscope to check for yeast overgrowth.  How is this treated?  Treatment for this condition may include:  · Dandruff shampoo that is applied to the affected skin during showers or bathing.  · Over-the-counter medicated skin cream,  lotion, or soaps.  · Prescription antifungal medicine in the form of skin cream or pills.  · Medicine to help reduce itching.  Follow these instructions at home:  · Take over-the-counter and prescription medicines only as told by your health care provider.  · Apply dandruff shampoo to the affected area if your health care provider told you to do that. You may be instructed to scrub the affected skin for several minutes each day.  · Do not scratch the affected area of skin.  · Avoid hot and humid conditions.  · Do not use tanning booths.  · Try to avoid sweating a lot.  Contact a health care provider if:  · Your symptoms get worse.  · You have a fever.  · You have redness, swelling, or pain at the site of your rash.  · You have fluid or blood coming from your rash.  · Your rash feels warm to the touch.  · You have pus or a bad smell coming from your rash.  · Your rash returns (recurs) after treatment.  Summary  · Tinea versicolor is a common fungal infection of the skin. It causes a rash that appears as light or dark patches on the skin.  · The rash most often occurs on the chest, back, neck, or upper arms.  · A health care provider can usually diagnose this condition by looking at your skin.  · Treatment may include applying shampoo to the skin and taking or applying medicines.  This information is not intended to replace advice given to you by your health care provider. Make sure you discuss any questions you have with your health care provider.  Document Revised: 11/30/2018 Document Reviewed: 08/21/2018  [employer] Patient Education © 2021 [employer]

## 2021-06-08 PROBLEM — Q16.1: Chronic | Status: ACTIVE | Noted: 2019-06-14

## 2021-06-08 PROBLEM — M41.25 OTHER IDIOPATHIC SCOLIOSIS, THORACOLUMBAR REGION: Status: ACTIVE | Noted: 2021-06-08

## 2021-06-08 PROBLEM — H35.713 CENTRAL SEROUS CHORIORETINOPATHY OF BOTH EYES: Chronic | Status: ACTIVE | Noted: 2018-09-19

## 2021-06-08 PROBLEM — M41.25 OTHER IDIOPATHIC SCOLIOSIS, THORACOLUMBAR REGION: Chronic | Status: ACTIVE | Noted: 2021-06-08

## 2021-06-08 NOTE — PROGRESS NOTES
"Chief Complaint  Back Pain (referral ) and Neck Pain    Subjective          Rush Ruiz presents to [facility] who presents due to neck and back pain.    He has a hx of scoliosis with surgical correction at the age of 13. He has experienced chronic neck and back pain since then which has been managed with exercise (running) and stretching regimen (yoga). He was doing well until December 2020 when he experienced worsening pain after moving, denies acute injury or trauma. Since then he c/o increased neck pain and stiffness radiating into the lumbar spine. Denies paresthesias and/or weakness of extremities.      Objective   Vital Signs:   /70 (BP Location: Right arm, Patient Position: Sitting, Cuff Size: Adult)   Pulse 55   Temp 98 °F (36.7 °C) (Temporal)   Resp 16   Ht 182.9 cm (72\")   Wt 80 kg (176 lb 6.4 oz)   SpO2 98%   BMI 23.92 kg/m²     Physical Exam  Constitutional:       Appearance: He is well-developed. He is not ill-appearing.   HENT:      Head: Normocephalic.      Right Ear: Hearing, tympanic membrane and external ear normal.      Left Ear: Hearing, tympanic membrane and external ear normal.      Nose: Nose normal. No nasal deformity, mucosal edema or rhinorrhea.      Right Sinus: No maxillary sinus tenderness or frontal sinus tenderness.      Left Sinus: No maxillary sinus tenderness or frontal sinus tenderness.      Mouth/Throat:      Dentition: Normal dentition.   Eyes:      General: Lids are normal.         Right eye: No discharge.         Left eye: No discharge.      Conjunctiva/sclera: Conjunctivae normal.      Right eye: No exudate.     Left eye: No exudate.  Neck:      Thyroid: No thyroid mass or thyromegaly.      Vascular: No carotid bruit.      Trachea: Trachea normal.   Cardiovascular:      Rate and Rhythm: Regular rhythm.      Pulses: Normal pulses.      Heart sounds: Normal heart sounds. No murmur heard.     Pulmonary:      Effort: No respiratory " distress.      Breath sounds: Normal breath sounds. No decreased breath sounds, wheezing, rhonchi or rales.   Abdominal:      General: Bowel sounds are normal.      Palpations: Abdomen is soft.      Tenderness: There is no abdominal tenderness.   Musculoskeletal:      Cervical back: Normal range of motion. No edema. Muscular tenderness present.      Thoracic back: No swelling. Scoliosis present.      Lumbar back: No swelling.   Lymphadenopathy:      Head:      Right side of head: No submental, submandibular, tonsillar, preauricular, posterior auricular or occipital adenopathy.      Left side of head: No submental, submandibular, tonsillar, preauricular, posterior auricular or occipital adenopathy.   Skin:     General: Skin is warm and dry.      Nails: There is no clubbing.   Neurological:      Mental Status: He is alert.      Sensory: Sensation is intact.      Motor: Motor function is intact.   Psychiatric:         Behavior: Behavior is cooperative.        Result Review :   The following data was reviewed by: WESLEY Macdonald on 06/07/2021:  Common labs    Common Labsle 7/7/20 7/7/20 7/7/20 1/8/21 1/8/21 1/8/21    1141 1141 1141 1027 1027 1027   Glucose  95   85    BUN  13   18    Creatinine  0.88   0.88    eGFR Non  Am  95   95    eGFR  Am  116   115    Sodium  140   141    Potassium  4.6   4.7    Chloride  102   103    Calcium  9.4   9.4    Total Protein  6.9   7.0    Albumin  4.80   4.70    Total Bilirubin  0.5   0.4    Alkaline Phosphatase  39   43    AST (SGOT)  17   29    ALT (SGPT)  16   24    WBC 3.83   4.33     Hemoglobin 14.5   15.2     Hematocrit 42.1   44.9     Platelets 195   212     Total Cholesterol   168   158   Triglycerides   63   39   HDL Cholesterol   65 (A)   67 (A)   LDL Cholesterol    90   82   (A) Abnormal value       Comments are available for some flowsheets but are not being displayed.                     Assessment and Plan    Diagnoses and all orders for this  visit:    1. Other idiopathic scoliosis, thoracolumbar region (Primary)  Assessment & Plan:  He has experienced chronic neck and back pain since then which has been managed with exercise (running) and stretching regimen (yoga). He was doing well until December 2020 when he experienced worsening pain after moving, denies acute injury or trauma. Since then he c/o increased neck pain and stiffness radiating into the lumbar spine.  He will start therapy and continue to monitor, has appt with chiro. He will need xrays (will call office if not done at chiro office for order).    Orders:  -     Ambulatory Referral to Chiropractic    2. Chronic neck pain  -     Ambulatory Referral to Chiropractic    3. Acute bilateral low back pain without sciatica  -     Ambulatory Referral to Chiropractic      Follow Up   Return if symptoms worsen or fail to improve, for Next scheduled follow up.  Patient was given instructions and counseling regarding his condition or for health maintenance advice. Please see specific information pulled into the AVS if appropriate.

## 2021-06-08 NOTE — ASSESSMENT & PLAN NOTE
He has experienced chronic neck and back pain since then which has been managed with exercise (running) and stretching regimen (yoga). He was doing well until December 2020 when he experienced worsening pain after moving, denies acute injury or trauma. Since then he c/o increased neck pain and stiffness radiating into the lumbar spine.  He will start therapy and continue to monitor, has appt with chiro. He will need xrays (will call office if not done at chiro office for order).

## 2021-07-08 ENCOUNTER — OFFICE VISIT (OUTPATIENT)
Dept: INTERNAL MEDICINE | Facility: CLINIC | Age: 41
End: 2021-07-08

## 2021-07-08 VITALS
SYSTOLIC BLOOD PRESSURE: 120 MMHG | HEART RATE: 69 BPM | OXYGEN SATURATION: 98 % | DIASTOLIC BLOOD PRESSURE: 78 MMHG | WEIGHT: 184 LBS | BODY MASS INDEX: 24.92 KG/M2 | HEIGHT: 72 IN | TEMPERATURE: 98 F

## 2021-07-08 DIAGNOSIS — E78.00 HYPERCHOLESTEREMIA: Chronic | ICD-10-CM

## 2021-07-08 DIAGNOSIS — M41.25 OTHER IDIOPATHIC SCOLIOSIS, THORACOLUMBAR REGION: Chronic | ICD-10-CM

## 2021-07-08 DIAGNOSIS — F41.9 ANXIETY: ICD-10-CM

## 2021-07-08 DIAGNOSIS — J30.9 ALLERGIC RHINITIS, UNSPECIFIED SEASONALITY, UNSPECIFIED TRIGGER: Chronic | ICD-10-CM

## 2021-07-08 DIAGNOSIS — Z00.00 PHYSICAL EXAM: Primary | ICD-10-CM

## 2021-07-08 DIAGNOSIS — J45.40 MODERATE PERSISTENT ASTHMA WITHOUT COMPLICATION: ICD-10-CM

## 2021-07-08 LAB
ALBUMIN SERPL-MCNC: 4.9 G/DL (ref 3.5–5.2)
ALBUMIN/GLOB SERPL: 2.2 G/DL
ALP SERPL-CCNC: 54 U/L (ref 39–117)
ALT SERPL-CCNC: 24 U/L (ref 1–41)
APPEARANCE UR: CLEAR
AST SERPL-CCNC: 22 U/L (ref 1–40)
BILIRUB SERPL-MCNC: <0.2 MG/DL (ref 0–1.2)
BILIRUB UR QL STRIP: NEGATIVE
BUN SERPL-MCNC: 13 MG/DL (ref 6–20)
BUN/CREAT SERPL: 16 (ref 7–25)
CALCIUM SERPL-MCNC: 9.6 MG/DL (ref 8.6–10.5)
CHLORIDE SERPL-SCNC: 104 MMOL/L (ref 98–107)
CHOLEST SERPL-MCNC: 191 MG/DL (ref 0–200)
CO2 SERPL-SCNC: 27.4 MMOL/L (ref 22–29)
COLOR UR: YELLOW
CREAT SERPL-MCNC: 0.81 MG/DL (ref 0.76–1.27)
ERYTHROCYTE [DISTWIDTH] IN BLOOD BY AUTOMATED COUNT: 12.8 % (ref 12.3–15.4)
GLOBULIN SER CALC-MCNC: 2.2 GM/DL
GLUCOSE SERPL-MCNC: 110 MG/DL (ref 65–99)
GLUCOSE UR QL: NEGATIVE
HCT VFR BLD AUTO: 48.8 % (ref 37.5–51)
HDLC SERPL-MCNC: 63 MG/DL (ref 40–60)
HGB BLD-MCNC: 16.4 G/DL (ref 13–17.7)
HGB UR QL STRIP: NEGATIVE
KETONES UR QL STRIP: NEGATIVE
LDLC SERPL CALC-MCNC: 104 MG/DL (ref 0–100)
LEUKOCYTE ESTERASE UR QL STRIP: NEGATIVE
MCH RBC QN AUTO: 31.8 PG (ref 26.6–33)
MCHC RBC AUTO-ENTMCNC: 33.6 G/DL (ref 31.5–35.7)
MCV RBC AUTO: 94.6 FL (ref 79–97)
NITRITE UR QL STRIP: NEGATIVE
PH UR STRIP: 5.5 [PH] (ref 5–8)
PLATELET # BLD AUTO: 227 10*3/MM3 (ref 140–450)
POTASSIUM SERPL-SCNC: 4.8 MMOL/L (ref 3.5–5.2)
PROT SERPL-MCNC: 7.1 G/DL (ref 6–8.5)
PROT UR QL STRIP: NEGATIVE
RBC # BLD AUTO: 5.16 10*6/MM3 (ref 4.14–5.8)
SODIUM SERPL-SCNC: 143 MMOL/L (ref 136–145)
SP GR UR: 1.02 (ref 1–1.03)
TRIGL SERPL-MCNC: 137 MG/DL (ref 0–150)
TSH SERPL DL<=0.005 MIU/L-ACNC: 1.31 UIU/ML (ref 0.27–4.2)
UROBILINOGEN UR STRIP-MCNC: NORMAL MG/DL
VLDLC SERPL CALC-MCNC: 24 MG/DL (ref 5–40)
WBC # BLD AUTO: 5.25 10*3/MM3 (ref 3.4–10.8)

## 2021-07-08 PROCEDURE — 99396 PREV VISIT EST AGE 40-64: CPT | Performed by: NURSE PRACTITIONER

## 2021-07-08 RX ORDER — ALPRAZOLAM 0.25 MG/1
0.25 TABLET ORAL 3 TIMES DAILY PRN
Qty: 45 TABLET | Refills: 0 | Status: SHIPPED | OUTPATIENT
Start: 2021-07-08

## 2021-07-08 NOTE — PROGRESS NOTES
Subjective   Rush Ruiz is a 40 y.o. male who presents for a physical exam.    He continues to c/o low back pain and stiffness, has seen chiro with mild improvement. He is planning to begin PT at [facility] due to low back pain and stiffness.       The following portions of the patient's history were reviewed and updated as appropriate: allergies, current medications, past social history and problem list.    Past Medical History:   Diagnosis Date   • Allergic rhinitis    • Anxiety    • Asthma     intermittent asthma without complications.   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 Milton Pineda         Current Outpatient Medications:   •  albuterol (PROVENTIL HFA;VENTOLIN HFA) 108 (90 BASE) MCG/ACT inhaler, Inhale 2 puffs Every 4 (Four) Hours As Needed for Wheezing., Disp: 1 inhaler, Rfl: 3  •  ALPRAZolam (Xanax) 0.25 MG tablet, Take 1 tablet by mouth 3 (Three) Times a Day As Needed for Anxiety., Disp: 45 tablet, Rfl: 0  •  Bilberry, Vaccinium myrtillus, 60 MG capsule, Take 280 mg by mouth., Disp: , Rfl:   •  budesonide-formoterol (SYMBICORT) 80-4.5 MCG/ACT inhaler, INHALE TWO PUFFS BY MOUTH TWICE A DAY. **RINSE MOUTH AND SPIT AFTER USE**, Disp: 10.2 g, Rfl: 1  •  montelukast (SINGULAIR) 10 MG tablet, Take 1 tablet by mouth Daily., Disp: 30 tablet, Rfl: 10  •  MULTIPLE VITAMINS-MINERALS PO, Take 1 capsule by mouth., Disp: , Rfl:   •  NON FORMULARY, Allergy injections every 3 weeks, Disp: , Rfl:   •  rosuvastatin (CRESTOR) 5 MG tablet, TAKE ONE TABLET BY MOUTH DAILY, Disp: 90 tablet, Rfl: 1  •  traZODone (DESYREL) 50 MG tablet, Take 1 tablet by mouth At Night As Needed for Sleep., Disp: 30 tablet, Rfl: 1    No Known Allergies    Review of Systems   Constitutional: Negative for activity change, appetite change, chills, diaphoresis, fatigue, fever and unexpected weight  "change.   HENT: Negative for congestion, dental problem, drooling, ear discharge, ear pain, facial swelling, hearing loss, mouth sores, nosebleeds, postnasal drip, rhinorrhea, sinus pressure, sore throat, tinnitus and trouble swallowing.    Eyes: Negative for photophobia, pain, discharge, redness, itching and visual disturbance.   Respiratory: Negative for apnea, cough, choking, chest tightness, shortness of breath and wheezing.    Cardiovascular: Negative for chest pain, palpitations and leg swelling.        No orthopnea, PND, HEWITT   Gastrointestinal: Negative for abdominal pain, blood in stool, constipation, diarrhea, nausea and vomiting.   Endocrine: Negative for cold intolerance, heat intolerance, polydipsia and polyuria.   Genitourinary: Negative for decreased urine volume, dysuria, enuresis, flank pain, frequency, hematuria and urgency.   Musculoskeletal: Positive for arthralgias and back pain (starting with PT). Negative for gait problem, joint swelling, myalgias, neck pain and neck stiffness.   Skin: Negative for color change and rash.        No hair changes, no nail changes   Allergic/Immunologic: Negative for environmental allergies, food allergies and immunocompromised state.   Neurological: Negative for dizziness, tremors, seizures, syncope, speech difficulty, weakness, light-headedness, numbness and headaches.   Hematological: Negative for adenopathy. Does not bruise/bleed easily.   Psychiatric/Behavioral: Negative for agitation, confusion, decreased concentration, dysphoric mood, sleep disturbance and suicidal ideas. The patient is not nervous/anxious.        Objective   Vitals:    07/08/21 0903   BP: 120/78   BP Location: Left arm   Patient Position: Sitting   Cuff Size: Adult   Pulse: 69   Temp: 98 °F (36.7 °C)   TempSrc: Temporal   SpO2: 98%   Weight: 83.5 kg (184 lb)   Height: 182.9 cm (72\")     Body mass index is 24.95 kg/m².  Physical Exam  Constitutional:       General: He is not in acute " distress.     Appearance: Normal appearance. He is not diaphoretic.   HENT:      Head: Normocephalic and atraumatic.      Right Ear: Tympanic membrane, ear canal and external ear normal.      Ears:      Comments: Atresia left ear     Nose: Nose normal. No rhinorrhea.      Mouth/Throat:      Mouth: Mucous membranes are moist.      Pharynx: Oropharynx is clear.   Eyes:      General:         Right eye: No discharge.         Left eye: No discharge.      Conjunctiva/sclera: Conjunctivae normal.   Cardiovascular:      Rate and Rhythm: Normal rate and regular rhythm.      Pulses: Normal pulses.      Heart sounds: Normal heart sounds.   Pulmonary:      Effort: Pulmonary effort is normal.      Breath sounds: Normal breath sounds.   Abdominal:      General: Bowel sounds are normal.      Tenderness: There is no abdominal tenderness.   Musculoskeletal:         General: No swelling or tenderness.      Cervical back: Normal range of motion.   Skin:     General: Skin is warm and dry.   Neurological:      General: No focal deficit present.      Mental Status: He is alert and oriented to person, place, and time.   Psychiatric:         Mood and Affect: Mood normal.         Behavior: Behavior normal.         Judgment: Judgment normal.         Assessment/Plan   Diagnoses and all orders for this visit:    1. Physical exam (Primary)  -     CBC (No Diff)  -     Comprehensive Metabolic Panel  -     Lipid Panel  -     TSH  -     Urinalysis With Microscopic If Indicated (No Culture) - Urine, Clean Catch    2. Hypercholesteremia  Assessment & Plan:  Lipid abnormalities are unchanged.  Pharmacotherapy as ordered.  Lipids will be reassessed in 6 months.      3. Allergic rhinitis, unspecified seasonality, unspecified trigger  Assessment & Plan:  Currently receiving allergy injections every 3 weeks, managed on Singulair daily.      4. Moderate persistent asthma without complication  Assessment & Plan:  Sx stable with Symbicort as needed with  infrequent Albuterol usage        5. Anxiety  Assessment & Plan:  Takes Xanax very rarely for breakthrough symptoms    Orders:  -     ALPRAZolam (Xanax) 0.25 MG tablet; Take 1 tablet by mouth 3 (Three) Times a Day As Needed for Anxiety.  Dispense: 45 tablet; Refill: 0    6. Other idiopathic scoliosis, thoracolumbar region  Assessment & Plan:  States sx are mildly improved, plans to begin [facility] for further mgmt    BENJAMIN query complete. Treatment plan to include limited course of prescribed  controlled substance. Risks including addiction, benefits, and alternatives presented to patient.       Risk assessment:  He has a family hx (father) of CAD-currently managed on statin therapy.  He will fu with [facility] PT for mgmt of mid and low back pain.  His Body mass index is 24.95 kg/m². - He follows a vegetarian diet, has not been as active since recent back injury.    Prevention:  He has received his annual flu vaccine. Tdap is current.  Colonoscopy is due in 2022.     Discussed healthy lifestyle choices such as maintaining a balanced diet low in carbohydrates and limiting caffeine and alcohol intake.  Recommended routine exercise for bone strength and cardiovascular health.

## 2021-07-11 PROBLEM — F41.9 ANXIETY: Chronic | Status: ACTIVE | Noted: 2021-07-11

## 2021-07-11 PROBLEM — F41.9 ANXIETY: Status: ACTIVE | Noted: 2021-07-11

## 2021-07-20 ENCOUNTER — TELEPHONE (OUTPATIENT)
Dept: INTERNAL MEDICINE | Facility: CLINIC | Age: 41
End: 2021-07-20

## 2021-07-20 NOTE — TELEPHONE ENCOUNTER
Pt advised to discuss with his PT, if necessary they will have to get approval from his insurance company.

## 2021-07-20 NOTE — TELEPHONE ENCOUNTER
THE PATIENT STATES THAT HE RECEIVED A LETTER FROM HIS HEALTH INSURANCE COMPANY TO LET HIM KNOW THAT THEY ONLY APPROVED 6 SESSIONS OF PHYSICAL THERAPY FOR HIM. THE PATIENT STATES THAT HE WILL NEED MORE THAN 6 SESSIONS, AND IS NOW ASKING FOR 6-10 MORE SESSIONS. PLEASE ADVISE BY CALLING [phone].

## 2021-10-12 DIAGNOSIS — E78.00 HYPERCHOLESTEREMIA: ICD-10-CM

## 2021-10-12 RX ORDER — ROSUVASTATIN CALCIUM 5 MG/1
TABLET, COATED ORAL
Qty: 90 TABLET | Refills: 1 | Status: SHIPPED | OUTPATIENT
Start: 2021-10-12 | End: 2022-06-08

## 2022-01-13 ENCOUNTER — OFFICE VISIT (OUTPATIENT)
Dept: INTERNAL MEDICINE | Facility: CLINIC | Age: 42
End: 2022-01-13

## 2022-01-13 VITALS
WEIGHT: 178 LBS | HEART RATE: 78 BPM | BODY MASS INDEX: 24.11 KG/M2 | DIASTOLIC BLOOD PRESSURE: 78 MMHG | TEMPERATURE: 98 F | SYSTOLIC BLOOD PRESSURE: 122 MMHG | HEIGHT: 72 IN | OXYGEN SATURATION: 98 %

## 2022-01-13 DIAGNOSIS — Z23 NEED FOR PNEUMOCOCCAL VACCINATION: ICD-10-CM

## 2022-01-13 DIAGNOSIS — J45.40 MODERATE PERSISTENT ASTHMA WITHOUT COMPLICATION: Chronic | ICD-10-CM

## 2022-01-13 DIAGNOSIS — E78.00 HYPERCHOLESTEREMIA: Chronic | ICD-10-CM

## 2022-01-13 DIAGNOSIS — M41.25 OTHER IDIOPATHIC SCOLIOSIS, THORACOLUMBAR REGION: Chronic | ICD-10-CM

## 2022-01-13 DIAGNOSIS — J30.9 ALLERGIC RHINITIS, UNSPECIFIED SEASONALITY, UNSPECIFIED TRIGGER: Primary | Chronic | ICD-10-CM

## 2022-01-13 PROCEDURE — 99214 OFFICE O/P EST MOD 30 MIN: CPT | Performed by: NURSE PRACTITIONER

## 2022-01-13 NOTE — PROGRESS NOTES
"Chief Complaint  Hyperlipidemia, Asthma, and Allergies    Subjective          Rush uRiz presents to [facility] for f/u regarding hypercholesteremia, back pain and allergies.    He is followed by [facility] and is doing well with allergy injections, managed on Singulair daily. He is using Symbicort daily with infrequent Albuterol inh use. Denies chest pain and/or shortness of breath.  His back pain is steadily improving, has attended PT and has regular stretching exercises which have been helpful.      Objective   Vital Signs:   /78 (BP Location: Left arm, Patient Position: Sitting, Cuff Size: Adult)   Pulse 78   Temp 98 °F (36.7 °C) (Temporal)   Ht 182.9 cm (72\")   Wt 80.7 kg (178 lb)   SpO2 98%   BMI 24.14 kg/m²     Physical Exam  Constitutional:       Appearance: He is well-developed. He is not ill-appearing.   HENT:      Head: Normocephalic.      Right Ear: Hearing, tympanic membrane and external ear normal.      Ears:      Comments: Atresia left ear     Nose: Nose normal. No nasal deformity, mucosal edema or rhinorrhea.      Right Sinus: No maxillary sinus tenderness or frontal sinus tenderness.      Left Sinus: No maxillary sinus tenderness or frontal sinus tenderness.      Mouth/Throat:      Dentition: Normal dentition.   Eyes:      General: Lids are normal.         Right eye: No discharge.         Left eye: No discharge.      Conjunctiva/sclera: Conjunctivae normal.      Right eye: No exudate.     Left eye: No exudate.  Neck:      Thyroid: No thyroid mass or thyromegaly.      Vascular: No carotid bruit.      Trachea: Trachea normal.   Cardiovascular:      Rate and Rhythm: Regular rhythm.      Pulses: Normal pulses.      Heart sounds: Normal heart sounds. No murmur heard.      Pulmonary:      Effort: No respiratory distress.      Breath sounds: Normal breath sounds. No decreased breath sounds, wheezing, rhonchi or rales.   Abdominal:      General: " Bowel sounds are normal.      Palpations: Abdomen is soft.      Tenderness: There is no abdominal tenderness.   Musculoskeletal:      Cervical back: Normal range of motion. No edema.   Lymphadenopathy:      Head:      Right side of head: No submental, submandibular, tonsillar, preauricular, posterior auricular or occipital adenopathy.      Left side of head: No submental, submandibular, tonsillar, preauricular, posterior auricular or occipital adenopathy.   Skin:     General: Skin is warm and dry.      Nails: There is no clubbing.   Neurological:      Mental Status: He is alert.   Psychiatric:         Behavior: Behavior is cooperative.        Result Review :   The following data was reviewed by: WESLEY Macdonald on 01/13/2022:  Common labs    Common Labsle 7/8/21 7/8/21 7/8/21 1/13/22 1/13/22 1/13/22    0948 0948 0948 0950 0950 0950   Glucose  110 (A)   96    BUN  13   11    Creatinine  0.81   1.01    eGFR Non  Am  105   91    eGFR  Am  127   105    Sodium  143   140    Potassium  4.8   4.6    Chloride  104   103    Calcium  9.6   9.6    Total Protein  7.1   6.8    Albumin  4.90   4.8    Total Bilirubin  <0.2   0.5    Alkaline Phosphatase  54   42 (A)    AST (SGOT)  22   26    ALT (SGPT)  24   28    WBC 5.25   4.6     Hemoglobin 16.4   15.3     Hematocrit 48.8   45.1     Platelets 227   192     Total Cholesterol   191   153   Triglycerides   137   67   HDL Cholesterol   63 (A)   49   LDL Cholesterol    104 (A)   91   (A) Abnormal value       Comments are available for some flowsheets but are not being displayed.                     Assessment and Plan    Diagnoses and all orders for this visit:    1. Allergic rhinitis, unspecified seasonality, unspecified trigger (Primary)  Assessment & Plan:  Currently managed on allergy injections every 3 weeks, taking Singulair daily.      2. Hypercholesteremia  Assessment & Plan:  She denies myalgias with rosuvastatin which she will continue along with a  low-fat, low-cholesterol diet.    Orders:  -     CBC & Differential  -     Comprehensive Metabolic Panel  -     Lipid Panel    3. Moderate persistent asthma without complication  Assessment & Plan:  He is using Symbicort daily with infrequent Albuterol inh use        4. Other idiopathic scoliosis, thoracolumbar region  Assessment & Plan:  Chronic low back pain is stable and recent exacerbation is steadily improving, continue home exercises.      5. Need for pneumococcal vaccination  -     pneumococcal polysaccharide 23-valent (PNEUMOVAX-23) vaccine 0.5 mL      Follow Up   Return in about 6 months (around 7/13/2022).  Patient was given instructions and counseling regarding his condition or for health maintenance advice. Please see specific information pulled into the AVS if appropriate.

## 2022-01-14 LAB
ALBUMIN SERPL-MCNC: 4.8 G/DL (ref 4–5)
ALBUMIN/GLOB SERPL: 2.4 {RATIO} (ref 1.2–2.2)
ALP SERPL-CCNC: 42 IU/L (ref 44–121)
ALT SERPL-CCNC: 28 IU/L (ref 0–44)
AST SERPL-CCNC: 26 IU/L (ref 0–40)
BASOPHILS # BLD AUTO: 0 X10E3/UL (ref 0–0.2)
BASOPHILS NFR BLD AUTO: 1 %
BILIRUB SERPL-MCNC: 0.5 MG/DL (ref 0–1.2)
BUN SERPL-MCNC: 11 MG/DL (ref 6–24)
BUN/CREAT SERPL: 11 (ref 9–20)
CALCIUM SERPL-MCNC: 9.6 MG/DL (ref 8.7–10.2)
CHLORIDE SERPL-SCNC: 103 MMOL/L (ref 96–106)
CHOLEST SERPL-MCNC: 153 MG/DL (ref 100–199)
CO2 SERPL-SCNC: 28 MMOL/L (ref 20–29)
CREAT SERPL-MCNC: 1.01 MG/DL (ref 0.76–1.27)
EOSINOPHIL # BLD AUTO: 0.1 X10E3/UL (ref 0–0.4)
EOSINOPHIL NFR BLD AUTO: 3 %
ERYTHROCYTE [DISTWIDTH] IN BLOOD BY AUTOMATED COUNT: 12.4 % (ref 11.6–15.4)
GLOBULIN SER CALC-MCNC: 2 G/DL (ref 1.5–4.5)
GLUCOSE SERPL-MCNC: 96 MG/DL (ref 65–99)
HCT VFR BLD AUTO: 45.1 % (ref 37.5–51)
HDLC SERPL-MCNC: 49 MG/DL
HGB BLD-MCNC: 15.3 G/DL (ref 13–17.7)
IMM GRANULOCYTES # BLD AUTO: 0 X10E3/UL (ref 0–0.1)
IMM GRANULOCYTES NFR BLD AUTO: 0 %
LDLC SERPL CALC-MCNC: 91 MG/DL (ref 0–99)
LYMPHOCYTES # BLD AUTO: 2.1 X10E3/UL (ref 0.7–3.1)
LYMPHOCYTES NFR BLD AUTO: 46 %
MCH RBC QN AUTO: 30.1 PG (ref 26.6–33)
MCHC RBC AUTO-ENTMCNC: 33.9 G/DL (ref 31.5–35.7)
MCV RBC AUTO: 89 FL (ref 79–97)
MONOCYTES # BLD AUTO: 0.4 X10E3/UL (ref 0.1–0.9)
MONOCYTES NFR BLD AUTO: 8 %
NEUTROPHILS # BLD AUTO: 1.9 X10E3/UL (ref 1.4–7)
NEUTROPHILS NFR BLD AUTO: 42 %
PLATELET # BLD AUTO: 192 X10E3/UL (ref 150–450)
POTASSIUM SERPL-SCNC: 4.6 MMOL/L (ref 3.5–5.2)
PROT SERPL-MCNC: 6.8 G/DL (ref 6–8.5)
RBC # BLD AUTO: 5.08 X10E6/UL (ref 4.14–5.8)
SODIUM SERPL-SCNC: 140 MMOL/L (ref 134–144)
TRIGL SERPL-MCNC: 67 MG/DL (ref 0–149)
VLDLC SERPL CALC-MCNC: 13 MG/DL (ref 5–40)
WBC # BLD AUTO: 4.6 X10E3/UL (ref 3.4–10.8)

## 2022-01-16 NOTE — ASSESSMENT & PLAN NOTE
She denies myalgias with rosuvastatin which she will continue along with a low-fat, low-cholesterol diet.

## 2022-01-16 NOTE — ASSESSMENT & PLAN NOTE
Chronic low back pain is stable and recent exacerbation is steadily improving, continue home exercises.

## 2022-06-08 DIAGNOSIS — E78.00 HYPERCHOLESTEREMIA: ICD-10-CM

## 2022-06-08 RX ORDER — ROSUVASTATIN CALCIUM 5 MG/1
TABLET, COATED ORAL
Qty: 30 TABLET | Refills: 1 | Status: SHIPPED | OUTPATIENT
Start: 2022-06-08 | End: 2022-08-05 | Stop reason: SDUPTHER

## 2022-08-05 ENCOUNTER — OFFICE VISIT (OUTPATIENT)
Dept: INTERNAL MEDICINE | Facility: CLINIC | Age: 42
End: 2022-08-05

## 2022-08-05 VITALS
SYSTOLIC BLOOD PRESSURE: 128 MMHG | TEMPERATURE: 98.4 F | HEIGHT: 72 IN | HEART RATE: 50 BPM | OXYGEN SATURATION: 98 % | BODY MASS INDEX: 24.19 KG/M2 | WEIGHT: 178.6 LBS | DIASTOLIC BLOOD PRESSURE: 84 MMHG

## 2022-08-05 DIAGNOSIS — E78.00 HYPERCHOLESTEREMIA: ICD-10-CM

## 2022-08-05 DIAGNOSIS — J45.30 MILD PERSISTENT ASTHMA WITHOUT COMPLICATION: ICD-10-CM

## 2022-08-05 DIAGNOSIS — M41.25 OTHER IDIOPATHIC SCOLIOSIS, THORACOLUMBAR REGION: Chronic | ICD-10-CM

## 2022-08-05 DIAGNOSIS — Z12.11 SCREENING FOR COLON CANCER: ICD-10-CM

## 2022-08-05 DIAGNOSIS — F51.01 PRIMARY INSOMNIA: Chronic | ICD-10-CM

## 2022-08-05 DIAGNOSIS — Z00.00 PHYSICAL EXAM: Primary | ICD-10-CM

## 2022-08-05 PROCEDURE — 99396 PREV VISIT EST AGE 40-64: CPT | Performed by: NURSE PRACTITIONER

## 2022-08-05 RX ORDER — ROSUVASTATIN CALCIUM 5 MG/1
5 TABLET, COATED ORAL DAILY
Qty: 90 TABLET | Refills: 1 | Status: SHIPPED | OUTPATIENT
Start: 2022-08-05 | End: 2022-09-30 | Stop reason: SDUPTHER

## 2022-08-05 RX ORDER — BUDESONIDE AND FORMOTEROL FUMARATE DIHYDRATE 80; 4.5 UG/1; UG/1
2 AEROSOL RESPIRATORY (INHALATION)
Qty: 10.2 G | Refills: 1 | Status: SHIPPED | OUTPATIENT
Start: 2022-08-05 | End: 2022-11-04 | Stop reason: SDUPTHER

## 2022-08-05 RX ORDER — MONTELUKAST SODIUM 10 MG/1
10 TABLET ORAL DAILY
Qty: 90 TABLET | Refills: 1 | Status: SHIPPED | OUTPATIENT
Start: 2022-08-05 | End: 2022-09-30 | Stop reason: SDUPTHER

## 2022-08-05 NOTE — PROGRESS NOTES
Subjective   Rush Ruiz is a 41 y.o. male who is here for a physical exam.    He reports feeling well, has continued exercises for strengthening and states back pain has improved. He continues to modify his activity not to exacerbate symptoms, has not been running as consistently.        The following portions of the patient's history were reviewed and updated as appropriate: allergies, current medications, past social history and problem list.    Past Medical History:   Diagnosis Date   • Allergic rhinitis    • Anxiety    • Asthma     intermittent asthma without complications.   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 Milton Pineda         Current Outpatient Medications:   •  albuterol (PROVENTIL HFA;VENTOLIN HFA) 108 (90 BASE) MCG/ACT inhaler, Inhale 2 puffs Every 4 (Four) Hours As Needed for Wheezing., Disp: 1 inhaler, Rfl: 3  •  ALPRAZolam (Xanax) 0.25 MG tablet, Take 1 tablet by mouth 3 (Three) Times a Day As Needed for Anxiety., Disp: 45 tablet, Rfl: 0  •  Bilberry, Vaccinium myrtillus, 60 MG capsule, Take 280 mg by mouth., Disp: , Rfl:   •  budesonide-formoterol (SYMBICORT) 80-4.5 MCG/ACT inhaler, Inhale 2 puffs 2 (Two) Times a Day., Disp: 10.2 g, Rfl: 1  •  montelukast (SINGULAIR) 10 MG tablet, Take 1 tablet by mouth Daily., Disp: 90 tablet, Rfl: 1  •  MULTIPLE VITAMINS-MINERALS PO, Take 1 capsule by mouth., Disp: , Rfl:   •  NON FORMULARY, Allergy injections every 3 weeks, Disp: , Rfl:   •  rosuvastatin (CRESTOR) 5 MG tablet, Take 1 tablet by mouth Daily., Disp: 90 tablet, Rfl: 1  •  traZODone (DESYREL) 50 MG tablet, Take 1 tablet by mouth At Night As Needed for Sleep., Disp: 30 tablet, Rfl: 1    No Known Allergies    Review of Systems   Constitutional: Negative for activity change, appetite change, chills, diaphoresis, fatigue, fever and unexpected  "weight change.   HENT: Positive for congestion and postnasal drip. Negative for dental problem, drooling, ear discharge, ear pain, facial swelling, hearing loss, mouth sores, nosebleeds, rhinorrhea, sinus pressure, sore throat, tinnitus and trouble swallowing.    Eyes: Negative for photophobia, pain, discharge, redness, itching and visual disturbance.   Respiratory: Negative for apnea, cough, choking, chest tightness, shortness of breath and wheezing.    Cardiovascular: Negative for chest pain, palpitations and leg swelling.        No orthopnea, PND, HEWITT   Gastrointestinal: Negative for abdominal pain, blood in stool, constipation, diarrhea, nausea and vomiting.   Endocrine: Negative for cold intolerance, heat intolerance, polydipsia and polyuria.   Genitourinary: Negative for decreased urine volume, dysuria, enuresis, flank pain, frequency, hematuria and urgency.   Musculoskeletal: Positive for back pain. Negative for arthralgias, gait problem, joint swelling, myalgias, neck pain and neck stiffness.   Skin: Negative for color change and rash.        No hair changes, no nail changes   Allergic/Immunologic: Negative for environmental allergies, food allergies and immunocompromised state.   Neurological: Negative for dizziness, tremors, seizures, syncope, speech difficulty, weakness, light-headedness, numbness and headaches.   Hematological: Negative for adenopathy. Does not bruise/bleed easily.   Psychiatric/Behavioral: Positive for sleep disturbance (takes Trazodone very rarely with good mgmt of sx). Negative for agitation, confusion, decreased concentration, dysphoric mood and suicidal ideas. The patient is not nervous/anxious.        Objective   Vitals:    08/05/22 1116   BP: 128/84   BP Location: Left arm   Patient Position: Sitting   Cuff Size: Adult   Pulse: 50   Temp: 98.4 °F (36.9 °C)   TempSrc: Temporal   SpO2: 98%   Weight: 81 kg (178 lb 9.6 oz)   Height: 182.9 cm (72\")     Physical Exam  Constitutional:  "      General: He is not in acute distress.     Appearance: Normal appearance. He is not diaphoretic.   HENT:      Head: Normocephalic and atraumatic.      Comments: Atresia left ear canal     Right Ear: Tympanic membrane, ear canal and external ear normal.      Left Ear: Tympanic membrane, ear canal and external ear normal.      Nose: Nose normal. No rhinorrhea.      Mouth/Throat:      Mouth: Mucous membranes are moist.      Pharynx: Oropharynx is clear.   Eyes:      General:         Right eye: No discharge.         Left eye: No discharge.      Conjunctiva/sclera: Conjunctivae normal.   Cardiovascular:      Rate and Rhythm: Normal rate and regular rhythm.      Pulses: Normal pulses.      Heart sounds: Normal heart sounds.   Pulmonary:      Effort: Pulmonary effort is normal.      Breath sounds: Normal breath sounds.   Abdominal:      General: Bowel sounds are normal.      Tenderness: There is no abdominal tenderness.   Musculoskeletal:         General: No swelling or tenderness.      Cervical back: Normal range of motion.   Skin:     General: Skin is warm and dry.   Neurological:      General: No focal deficit present.      Mental Status: He is alert and oriented to person, place, and time.   Psychiatric:         Mood and Affect: Mood normal.         Behavior: Behavior normal.         Judgment: Judgment normal.         Assessment & Plan   Diagnoses and all orders for this visit:    1. Physical exam (Primary)  -     CBC (No Diff)  -     Comprehensive Metabolic Panel  -     Lipid Panel  -     TSH  -     Urinalysis With Microscopic If Indicated (No Culture) - Urine, Clean Catch    2. Hypercholesteremia  Assessment & Plan:  He denies myalgias with rosuvastatin which he will continue along with a low-fat, low-cholesterol diet.      Orders:  -     rosuvastatin (CRESTOR) 5 MG tablet; Take 1 tablet by mouth Daily.  Dispense: 90 tablet; Refill: 1    3. Mild persistent asthma without complication  Assessment & Plan:  He is  taking Singulair daily with PRN usage of Symbicort and Albuterol inh.      Orders:  -     montelukast (SINGULAIR) 10 MG tablet; Take 1 tablet by mouth Daily.  Dispense: 90 tablet; Refill: 1  -     budesonide-formoterol (SYMBICORT) 80-4.5 MCG/ACT inhaler; Inhale 2 puffs 2 (Two) Times a Day.  Dispense: 10.2 g; Refill: 1    4. Other idiopathic scoliosis, thoracolumbar region    5. Primary insomnia  Assessment & Plan:  Sx improved with Trazodone as needed which he takes sparingly.      6. Screening for colon cancer  -     Ambulatory Referral to Gastroenterology    Other orders  -     Unable To Void    Risk assessment:  He has a family hx (father) of CAD-managed on statin therapy, recheck lipid panel. He also has a family hx (father) of colon cancer, receives screening colonoscopy every 5 years.  His Body mass index is 24.22 kg/m². - He exercises regularly and tries to follow a low-fat, low-cholesterol diet.    Prevention:  Health Maintenance   Topic Date Due   • ANNUAL PHYSICAL  07/09/2022   • INFLUENZA VACCINE  10/01/2022   • COLORECTAL CANCER SCREENING  12/29/2022   • Pneumococcal Vaccine 0-64 (2 - PCV) 01/13/2023   • LIPID PANEL  08/05/2023   • TDAP/TD VACCINES (2 - Td or Tdap) 10/08/2025   • HEPATITIS C SCREENING  Completed   • COVID-19 Vaccine  Completed       Discussed healthy lifestyle choices such as maintaining a balanced diet low in carbohydrates and limiting caffeine and alcohol intake.  Recommended routine exercise for bone strength and cardiovascular health.

## 2022-08-06 PROBLEM — J45.30 MILD PERSISTENT ASTHMA WITHOUT COMPLICATION: Chronic | Status: ACTIVE | Noted: 2022-08-06

## 2022-08-06 PROBLEM — J45.30 MILD PERSISTENT ASTHMA WITHOUT COMPLICATION: Status: ACTIVE | Noted: 2022-08-06

## 2022-08-06 LAB
ALBUMIN SERPL-MCNC: 4.9 G/DL (ref 4–5)
ALBUMIN/GLOB SERPL: 2.2 {RATIO} (ref 1.2–2.2)
ALP SERPL-CCNC: 45 IU/L (ref 44–121)
ALT SERPL-CCNC: 28 IU/L (ref 0–44)
AST SERPL-CCNC: 26 IU/L (ref 0–40)
BILIRUB SERPL-MCNC: 0.5 MG/DL (ref 0–1.2)
BUN SERPL-MCNC: 16 MG/DL (ref 6–24)
BUN/CREAT SERPL: 18 (ref 9–20)
CALCIUM SERPL-MCNC: 9.6 MG/DL (ref 8.7–10.2)
CHLORIDE SERPL-SCNC: 100 MMOL/L (ref 96–106)
CHOLEST SERPL-MCNC: 188 MG/DL (ref 100–199)
CO2 SERPL-SCNC: 24 MMOL/L (ref 20–29)
CREAT SERPL-MCNC: 0.9 MG/DL (ref 0.76–1.27)
EGFRCR SERPLBLD CKD-EPI 2021: 109 ML/MIN/1.73
ERYTHROCYTE [DISTWIDTH] IN BLOOD BY AUTOMATED COUNT: 12.6 % (ref 11.6–15.4)
GLOBULIN SER CALC-MCNC: 2.2 G/DL (ref 1.5–4.5)
GLUCOSE SERPL-MCNC: 97 MG/DL (ref 65–99)
HCT VFR BLD AUTO: 47 % (ref 37.5–51)
HDLC SERPL-MCNC: 58 MG/DL
HGB BLD-MCNC: 15.9 G/DL (ref 13–17.7)
LDLC SERPL CALC-MCNC: 115 MG/DL (ref 0–99)
MCH RBC QN AUTO: 31.1 PG (ref 26.6–33)
MCHC RBC AUTO-ENTMCNC: 33.8 G/DL (ref 31.5–35.7)
MCV RBC AUTO: 92 FL (ref 79–97)
PLATELET # BLD AUTO: 200 X10E3/UL (ref 150–450)
POTASSIUM SERPL-SCNC: 4.8 MMOL/L (ref 3.5–5.2)
PROT SERPL-MCNC: 7.1 G/DL (ref 6–8.5)
RBC # BLD AUTO: 5.12 X10E6/UL (ref 4.14–5.8)
SODIUM SERPL-SCNC: 138 MMOL/L (ref 134–144)
TRIGL SERPL-MCNC: 79 MG/DL (ref 0–149)
TSH SERPL DL<=0.005 MIU/L-ACNC: 0.98 UIU/ML (ref 0.45–4.5)
UNABLE TO VOID: NORMAL
VLDLC SERPL CALC-MCNC: 15 MG/DL (ref 5–40)
WBC # BLD AUTO: 5.5 X10E3/UL (ref 3.4–10.8)

## 2022-08-06 NOTE — ASSESSMENT & PLAN NOTE
He denies myalgias with rosuvastatin which he will continue along with a low-fat, low-cholesterol diet.

## 2022-09-30 DIAGNOSIS — J45.30 MILD PERSISTENT ASTHMA WITHOUT COMPLICATION: ICD-10-CM

## 2022-09-30 DIAGNOSIS — E78.00 HYPERCHOLESTEREMIA: ICD-10-CM

## 2022-09-30 RX ORDER — ROSUVASTATIN CALCIUM 5 MG/1
5 TABLET, COATED ORAL DAILY
Qty: 90 TABLET | Refills: 1 | Status: SHIPPED | OUTPATIENT
Start: 2022-09-30

## 2022-09-30 RX ORDER — MONTELUKAST SODIUM 10 MG/1
10 TABLET ORAL DAILY
Qty: 90 TABLET | Refills: 1 | Status: SHIPPED | OUTPATIENT
Start: 2022-09-30

## 2022-09-30 NOTE — TELEPHONE ENCOUNTER
Caller: Rush Ruiz    Relationship: Self    Best call back number: [phone]    Requested Prescriptions:   Requested Prescriptions     Pending Prescriptions Disp Refills   • rosuvastatin (CRESTOR) 5 MG tablet 90 tablet 1     Sig: Take 1 tablet by mouth Daily.   • montelukast (SINGULAIR) 10 MG tablet 90 tablet 1     Sig: Take 1 tablet by mouth Daily.        Pharmacy where request should be sent: [facility] [id number] [address] AT [address] [place] [phone] [place] [phone] FX     Additional details provided by patient: PATIENT WOULD LIKE A 90 DAY SUPPLY OF THESE MEDICATIONS, HE AND BELLA DISCUSSED THIS PREVIOUSLY AND NEEDS A MEDICATION REFILL  Does the patient have less than a 3 day supply:  [x] Yes  [] No    Halina Smiley Rep   09/30/22 13:51 EDT

## 2022-10-10 ENCOUNTER — TELEPHONE (OUTPATIENT)
Dept: INTERNAL MEDICINE | Facility: CLINIC | Age: 42
End: 2022-10-10

## 2022-10-27 ENCOUNTER — CLINICAL SUPPORT (OUTPATIENT)
Dept: INTERNAL MEDICINE | Facility: CLINIC | Age: 42
End: 2022-10-27

## 2022-10-27 DIAGNOSIS — Z23 NEED FOR INFLUENZA VACCINATION: Primary | ICD-10-CM

## 2022-10-27 PROCEDURE — 90471 IMMUNIZATION ADMIN: CPT | Performed by: NURSE PRACTITIONER

## 2022-10-27 PROCEDURE — 90686 IIV4 VACC NO PRSV 0.5 ML IM: CPT | Performed by: NURSE PRACTITIONER

## 2022-11-04 DIAGNOSIS — J45.30 MILD PERSISTENT ASTHMA WITHOUT COMPLICATION: ICD-10-CM

## 2022-11-04 RX ORDER — BUDESONIDE AND FORMOTEROL FUMARATE DIHYDRATE 80; 4.5 UG/1; UG/1
2 AEROSOL RESPIRATORY (INHALATION)
Qty: 10.2 G | Refills: 1 | Status: SHIPPED | OUTPATIENT
Start: 2022-11-04 | End: 2022-11-07 | Stop reason: SDUPTHER

## 2022-11-04 NOTE — TELEPHONE ENCOUNTER
Caller: Rush Ruiz    Relationship: Self    Best call back number: [phone]    Requested Prescriptions:   Requested Prescriptions     Pending Prescriptions Disp Refills   • budesonide-formoterol (SYMBICORT) 80-4.5 MCG/ACT inhaler 10.2 g 1     Sig: Inhale 2 puffs 2 (Two) Times a Day.        Pharmacy where request should be sent: [facility] [address] [place] [address], [place] [address] [facility] [phone] [facility] [phone] [place]     Additional details provided by patient: PATIENT WOULD LIKE TO TRANSFER THIS PRESCRIPTION TO THE [facility]. PLEASE ADVISE.    Does the patient have less than a 3 day supply:  [x] Yes  [] No    Halina Fischer Rep   11/04/22 09:10 EDT

## 2022-11-07 RX ORDER — BUDESONIDE AND FORMOTEROL FUMARATE DIHYDRATE 80; 4.5 UG/1; UG/1
2 AEROSOL RESPIRATORY (INHALATION)
Qty: 10.2 G | Refills: 1 | Status: SHIPPED | OUTPATIENT
Start: 2022-11-07

## 2022-11-07 NOTE — TELEPHONE ENCOUNTER
Caller: Rush Ruiz    Relationship: Self    Best call back number: [phone]    Requested Prescriptions:   Requested Prescriptions     Pending Prescriptions Disp Refills   • budesonide-formoterol (SYMBICORT) 80-4.5 MCG/ACT inhaler 10.2 g 1     Sig: Inhale 2 puffs 2 (Two) Times a Day.     Signed Prescriptions Disp Refills   • budesonide-formoterol (SYMBICORT) 80-4.5 MCG/ACT inhaler 10.2 g 1     Sig: Inhale 2 puffs 2 (Two) Times a Day.     Authorizing Provider: BELLA QUIROZ     Ordering User: REAL VICTORIA        Pharmacy where request should be sent: [address] ([facility]), [place] - [address] [facility] [phone] [facility] [phone]      Additional details provided by patient: PATIENT HAS A 1-2 DAY SUPPLY LEFT OF MEDICATION    THE PHARMACY TOLD THE PATIENT THAT THEY HAVE NO RECEIVED A PRESCRIPTION FOR THIS MEDICATION YET AND THE PATIENT WOULD LIKE TO GET AN UPDATE    Does the patient have less than a 3 day supply:  [x] Yes  [] No    Halina Woodall Rep   11/07/22 15:55 EST

## 2022-12-18 NOTE — ADDENDUM NOTE
Addended by: CIRO LEMUS on: 4/16/2019 02:28 PM     Modules accepted: Orders    
[place] Difficulty

## 2023-02-08 ENCOUNTER — OFFICE VISIT (OUTPATIENT)
Dept: INTERNAL MEDICINE | Facility: CLINIC | Age: 43
End: 2023-02-08
Payer: COMMERCIAL

## 2023-02-08 VITALS
OXYGEN SATURATION: 96 % | BODY MASS INDEX: 24.27 KG/M2 | TEMPERATURE: 98 F | HEIGHT: 72 IN | HEART RATE: 56 BPM | WEIGHT: 179.2 LBS | DIASTOLIC BLOOD PRESSURE: 78 MMHG | SYSTOLIC BLOOD PRESSURE: 110 MMHG

## 2023-02-08 DIAGNOSIS — J45.30 MILD PERSISTENT ASTHMA WITHOUT COMPLICATION: Primary | Chronic | ICD-10-CM

## 2023-02-08 DIAGNOSIS — E78.00 HYPERCHOLESTEREMIA: Chronic | ICD-10-CM

## 2023-02-08 DIAGNOSIS — J30.9 ALLERGIC RHINITIS, UNSPECIFIED SEASONALITY, UNSPECIFIED TRIGGER: Chronic | ICD-10-CM

## 2023-02-08 LAB
ALBUMIN SERPL-MCNC: 5.1 G/DL (ref 3.5–5.2)
ALBUMIN/GLOB SERPL: 2.6 G/DL
ALP SERPL-CCNC: 46 U/L (ref 39–117)
ALT SERPL-CCNC: 26 U/L (ref 1–41)
AST SERPL-CCNC: 24 U/L (ref 1–40)
BASOPHILS # BLD AUTO: 0.03 10*3/MM3 (ref 0–0.2)
BASOPHILS NFR BLD AUTO: 0.7 % (ref 0–1.5)
BILIRUB SERPL-MCNC: 0.6 MG/DL (ref 0–1.2)
BUN SERPL-MCNC: 10 MG/DL (ref 6–20)
BUN/CREAT SERPL: 10.8 (ref 7–25)
CALCIUM SERPL-MCNC: 9.7 MG/DL (ref 8.6–10.5)
CHLORIDE SERPL-SCNC: 104 MMOL/L (ref 98–107)
CHOLEST SERPL-MCNC: 152 MG/DL (ref 0–200)
CO2 SERPL-SCNC: 27.7 MMOL/L (ref 22–29)
CREAT SERPL-MCNC: 0.93 MG/DL (ref 0.76–1.27)
EGFRCR SERPLBLD CKD-EPI 2021: 103.8 ML/MIN/1.73
EOSINOPHIL # BLD AUTO: 0.11 10*3/MM3 (ref 0–0.4)
EOSINOPHIL NFR BLD AUTO: 2.5 % (ref 0.3–6.2)
ERYTHROCYTE [DISTWIDTH] IN BLOOD BY AUTOMATED COUNT: 11.8 % (ref 12.3–15.4)
GLOBULIN SER CALC-MCNC: 2 GM/DL
GLUCOSE SERPL-MCNC: 95 MG/DL (ref 65–99)
HCT VFR BLD AUTO: 46.1 % (ref 37.5–51)
HDLC SERPL-MCNC: 51 MG/DL (ref 40–60)
HGB BLD-MCNC: 15.7 G/DL (ref 13–17.7)
IMM GRANULOCYTES # BLD AUTO: 0.01 10*3/MM3 (ref 0–0.05)
IMM GRANULOCYTES NFR BLD AUTO: 0.2 % (ref 0–0.5)
LDLC SERPL CALC-MCNC: 85 MG/DL (ref 0–100)
LYMPHOCYTES # BLD AUTO: 1.9 10*3/MM3 (ref 0.7–3.1)
LYMPHOCYTES NFR BLD AUTO: 43.4 % (ref 19.6–45.3)
MCH RBC QN AUTO: 30.3 PG (ref 26.6–33)
MCHC RBC AUTO-ENTMCNC: 34.1 G/DL (ref 31.5–35.7)
MCV RBC AUTO: 89 FL (ref 79–97)
MONOCYTES # BLD AUTO: 0.32 10*3/MM3 (ref 0.1–0.9)
MONOCYTES NFR BLD AUTO: 7.3 % (ref 5–12)
NEUTROPHILS # BLD AUTO: 2.01 10*3/MM3 (ref 1.7–7)
NEUTROPHILS NFR BLD AUTO: 45.9 % (ref 42.7–76)
NRBC BLD AUTO-RTO: 0 /100 WBC (ref 0–0.2)
PLATELET # BLD AUTO: 214 10*3/MM3 (ref 140–450)
POTASSIUM SERPL-SCNC: 5.1 MMOL/L (ref 3.5–5.2)
PROT SERPL-MCNC: 7.1 G/DL (ref 6–8.5)
RBC # BLD AUTO: 5.18 10*6/MM3 (ref 4.14–5.8)
SODIUM SERPL-SCNC: 140 MMOL/L (ref 136–145)
TRIGL SERPL-MCNC: 87 MG/DL (ref 0–150)
VLDLC SERPL CALC-MCNC: 16 MG/DL (ref 5–40)
WBC # BLD AUTO: 4.38 10*3/MM3 (ref 3.4–10.8)

## 2023-02-08 PROCEDURE — 99214 OFFICE O/P EST MOD 30 MIN: CPT | Performed by: NURSE PRACTITIONER

## 2023-02-19 NOTE — ASSESSMENT & PLAN NOTE
He denies myalgias with rosuvastatin which he will continue along with a low-fat, low-cholesterol diet.   Lab Results   Component Value Date    LDL 85 02/08/2023

## 2023-02-19 NOTE — ASSESSMENT & PLAN NOTE
He is currently managed on allergy injections which have been helpful, also takes Singulair daily.    Peng Advancement Flap Text: The defect edges were debeveled with a #15 scalpel blade.  Given the location of the defect, shape of the defect and the proximity to free margins a Peng advancement flap was deemed most appropriate.  Using a sterile surgical marker, an appropriate advancement flap was drawn incorporating the defect and placing the expected incisions within the relaxed skin tension lines where possible. The area thus outlined was incised deep to adipose tissue with a #15 scalpel blade.  The skin margins were undermined to an appropriate distance in all directions utilizing iris scissors.

## 2023-02-19 NOTE — PROGRESS NOTES
"Chief Complaint  Anxiety (6 month follow up), Allergies, and Hyperlipidemia    Subjective        Rush Ruiz presents to [facility] for f/u regarding asthma, hypercholesteremia and allergic rhinitis    History of Present Illness  He notes improvement in chronic low back pain but does c/o left hip pain and stiffness, denies radicular sx. He states stretching exercises have been helpful with chronic tightness. Denies weakness of left lower leg.    Objective   Vital Signs:  /78 (BP Location: Left arm, Patient Position: Sitting, Cuff Size: Adult)   Pulse 56   Temp 98 °F (36.7 °C) (Temporal)   Ht 182.9 cm (72\")   Wt 81.3 kg (179 lb 3.2 oz)   SpO2 96%   BMI 24.30 kg/m²   Estimated body mass index is 24.3 kg/m² as calculated from the following:    Height as of this encounter: 182.9 cm (72\").    Weight as of this encounter: 81.3 kg (179 lb 3.2 oz).       BMI is within normal parameters. No other follow-up for BMI required.      Physical Exam  Constitutional:       Appearance: He is well-developed. He is not ill-appearing.   HENT:      Head: Normocephalic.      Right Ear: Hearing, tympanic membrane and external ear normal.      Ears:      Comments: Atresia left ear     Nose: Nose normal. No nasal deformity, mucosal edema or rhinorrhea.      Right Sinus: No maxillary sinus tenderness or frontal sinus tenderness.      Left Sinus: No maxillary sinus tenderness or frontal sinus tenderness.      Mouth/Throat:      Dentition: Normal dentition.   Eyes:      General: Lids are normal.         Right eye: No discharge.         Left eye: No discharge.      Conjunctiva/sclera: Conjunctivae normal.      Right eye: No exudate.     Left eye: No exudate.  Neck:      Thyroid: No thyroid mass or thyromegaly.      Vascular: No carotid bruit.      Trachea: Trachea normal.   Cardiovascular:      Rate and Rhythm: Regular rhythm.      Pulses: Normal pulses.      Heart sounds: Normal heart sounds. No " murmur heard.  Pulmonary:      Effort: No respiratory distress.      Breath sounds: Normal breath sounds. No decreased breath sounds, wheezing, rhonchi or rales.   Abdominal:      General: Bowel sounds are normal.      Palpations: Abdomen is soft.      Tenderness: There is no abdominal tenderness.   Musculoskeletal:      Cervical back: Normal range of motion. No edema.   Lymphadenopathy:      Head:      Right side of head: No submental, submandibular, tonsillar, preauricular, posterior auricular or occipital adenopathy.      Left side of head: No submental, submandibular, tonsillar, preauricular, posterior auricular or occipital adenopathy.   Skin:     General: Skin is warm and dry.      Nails: There is no clubbing.   Neurological:      Mental Status: He is alert.   Psychiatric:         Behavior: Behavior is cooperative.        Result Review :  The following data was reviewed by: WESLEY Macdonald on 02/08/2023:  Common labs    Common Labs 8/5/22 8/5/22 8/5/22 2/8/23 2/8/23 2/8/23    1203 1203 1203 0945 0945 0945   Glucose  97   95    BUN  16   10    Creatinine  0.90   0.93    Sodium  138   140    Potassium  4.8   5.1    Chloride  100   104    Calcium  9.6   9.7    Total Protein  7.1   7.1    Albumin  4.9   5.1    Total Bilirubin  0.5   0.6    Alkaline Phosphatase  45   46    AST (SGOT)  26   24    ALT (SGPT)  28   26    WBC 5.5   4.38     Hemoglobin 15.9   15.7     Hematocrit 47.0   46.1     Platelets 200   214     Total Cholesterol   188   152   Triglycerides   79   87   HDL Cholesterol   58   51   LDL Cholesterol    115 (A)   85   (A) Abnormal value       Comments are available for some flowsheets but are not being displayed.                        Assessment and Plan   Diagnoses and all orders for this visit:    1. Mild persistent asthma without complication (Primary)  Assessment & Plan:  Sx managed with allergy control, takes Symbicort as needed with infrequent albuterol use        2.  Hypercholesteremia  Assessment & Plan:  He denies myalgias with rosuvastatin which he will continue along with a low-fat, low-cholesterol diet.   Lab Results   Component Value Date    LDL 85 02/08/2023       Orders:  -     CBC & Differential  -     Comprehensive Metabolic Panel  -     Lipid Panel    3. Allergic rhinitis, unspecified seasonality, unspecified trigger  Assessment & Plan:  He is currently managed on allergy injections which have been helpful, also takes Singulair daily.            Follow Up   Return in about 6 months (around 8/8/2023) for Annual physical.  Patient was given instructions and counseling regarding his condition or for health maintenance advice. Please see specific information pulled into the AVS if appropriate.

## 2023-03-17 NOTE — TELEPHONE ENCOUNTER
Caller: Rush Ruiz    Relationship: Self     Best call back number: [phone]    What form or medical record are you requesting: MEDICAL NECESSITY LETTER     Who is requesting this form or medical record from you: FOR TAX PURPOSES     How would you like to receive the form or medical records (pick-up, mail, fax):      Additional notes: PT STATED THAT HE HAS HAD BACK PAIN FOR YEARS AND CURRENTLY DOES ACUPUNCTURE & MASSAGES TO RELIEF THE PAIN. PT IS REQUESTING A MEDICAL NECESSITY LETTER FROM PCP FOR TAX PURPOSES.         delivery

## 2023-04-03 ENCOUNTER — OFFICE VISIT (OUTPATIENT)
Dept: INTERNAL MEDICINE | Facility: CLINIC | Age: 43
End: 2023-04-03
Payer: COMMERCIAL

## 2023-04-03 VITALS
BODY MASS INDEX: 24.16 KG/M2 | HEIGHT: 72 IN | WEIGHT: 178.4 LBS | SYSTOLIC BLOOD PRESSURE: 118 MMHG | HEART RATE: 53 BPM | TEMPERATURE: 97.8 F | DIASTOLIC BLOOD PRESSURE: 78 MMHG | OXYGEN SATURATION: 98 %

## 2023-04-03 DIAGNOSIS — M54.2 CERVICALGIA: Primary | ICD-10-CM

## 2023-04-03 PROCEDURE — 99213 OFFICE O/P EST LOW 20 MIN: CPT | Performed by: NURSE PRACTITIONER

## 2023-04-03 NOTE — PROGRESS NOTES
"Chief Complaint  Neck Pain    Subjective        Rush Ruiz presents to [facility] due to neck pain.    History of Present Illness  He presents due to increased neck pain and stiffness since August without acute injury or trauma. He notes symptoms began as a \"crick in the neck.\" He denies radicular sx in arms. Pain is worse with movement in the neck, works on a computer which exacerbates symptoms. He started receiving weekly massages in January which have been mildly helpful, stopped the end of February. Denies headaches. He is taking Ibuprofen as needed with mild relief.      Objective   Vital Signs:  /78 (BP Location: Left arm, Patient Position: Sitting, Cuff Size: Adult)   Pulse 53   Temp 97.8 °F (36.6 °C) (Temporal)   Ht 182.9 cm (72\")   Wt 80.9 kg (178 lb 6.4 oz)   SpO2 98%   BMI 24.20 kg/m²   Estimated body mass index is 24.2 kg/m² as calculated from the following:    Height as of this encounter: 182.9 cm (72\").    Weight as of this encounter: 80.9 kg (178 lb 6.4 oz).       BMI is within normal parameters. No other follow-up for BMI required.      Physical Exam  Constitutional:       Appearance: He is well-developed. He is not ill-appearing.   HENT:      Head: Normocephalic.      Right Ear: Hearing, tympanic membrane and external ear normal.      Left Ear: Hearing, tympanic membrane and external ear normal.      Nose: Nose normal. No nasal deformity, mucosal edema or rhinorrhea.      Right Sinus: No maxillary sinus tenderness or frontal sinus tenderness.      Left Sinus: No maxillary sinus tenderness or frontal sinus tenderness.      Mouth/Throat:      Dentition: Normal dentition.   Eyes:      General: Lids are normal.         Right eye: No discharge.         Left eye: No discharge.      Conjunctiva/sclera: Conjunctivae normal.      Right eye: No exudate.     Left eye: No exudate.  Neck:      Thyroid: No thyroid mass or thyromegaly.      Vascular: No carotid bruit. "      Trachea: Trachea normal.   Cardiovascular:      Rate and Rhythm: Regular rhythm.      Pulses: Normal pulses.      Heart sounds: Normal heart sounds. No murmur heard.  Pulmonary:      Effort: No respiratory distress.      Breath sounds: Normal breath sounds. No decreased breath sounds, wheezing, rhonchi or rales.   Abdominal:      General: Bowel sounds are normal.      Palpations: Abdomen is soft.      Tenderness: There is no abdominal tenderness.   Musculoskeletal:      Cervical back: Normal range of motion. No edema. Muscular tenderness present.   Lymphadenopathy:      Head:      Right side of head: No submental, submandibular, tonsillar, preauricular, posterior auricular or occipital adenopathy.      Left side of head: No submental, submandibular, tonsillar, preauricular, posterior auricular or occipital adenopathy.   Skin:     General: Skin is warm and dry.      Nails: There is no clubbing.   Neurological:      Mental Status: He is alert.      Sensory: Sensation is intact.      Motor: Motor function is intact.   Psychiatric:         Behavior: Behavior is cooperative.        Result Review :  The following data was reviewed by: WESLEY Macdonald on 04/03/2023:  Common labs    Common Labs 8/5/22 8/5/22 8/5/22 2/8/23 2/8/23 2/8/23    1203 1203 1203 0945 0945 0945   Glucose  97   95    BUN  16   10    Creatinine  0.90   0.93    Sodium  138   140    Potassium  4.8   5.1    Chloride  100   104    Calcium  9.6   9.7    Total Protein  7.1   7.1    Albumin  4.9   5.1    Total Bilirubin  0.5   0.6    Alkaline Phosphatase  45   46    AST (SGOT)  26   24    ALT (SGPT)  28   26    WBC 5.5   4.38     Hemoglobin 15.9   15.7     Hematocrit 47.0   46.1     Platelets 200   214     Total Cholesterol   188   152   Triglycerides   79   87   HDL Cholesterol   58   51   LDL Cholesterol    115 (A)   85   (A) Abnormal value       Comments are available for some flowsheets but are not being displayed.                         Assessment and Plan   Diagnoses and all orders for this visit:    1. Cervicalgia (Primary)  -     Ambulatory Referral to Physical Therapy Evaluate and treat    Sx appear more muscular in nature, may continue Ibuprofen as needed for inflammation. However, will also begin PT for muscle tightness and inflammation. Consider further evaluation if sx persist and/or worsen.         Follow Up   Return if symptoms worsen or fail to improve, for Next scheduled follow up.  Patient was given instructions and counseling regarding his condition or for health maintenance advice. Please see specific information pulled into the AVS if appropriate.       Answers for HPI/ROS submitted by the patient on 3/27/2023  What is the primary reason for your visit?: Other  Please describe your symptoms.: Chronic neck pain  Have you had these symptoms before?: Yes  How long have you been having these symptoms?: Greater than 2 weeks

## 2023-05-21 DIAGNOSIS — J45.30 MILD PERSISTENT ASTHMA WITHOUT COMPLICATION: ICD-10-CM

## 2023-05-22 RX ORDER — BUDESONIDE AND FORMOTEROL FUMARATE DIHYDRATE 80; 4.5 UG/1; UG/1
AEROSOL RESPIRATORY (INHALATION)
Qty: 11 G | Refills: 0 | Status: SHIPPED | OUTPATIENT
Start: 2023-05-22

## 2023-08-09 ENCOUNTER — OFFICE VISIT (OUTPATIENT)
Dept: INTERNAL MEDICINE | Facility: CLINIC | Age: 43
End: 2023-08-09
Payer: COMMERCIAL

## 2023-08-09 VITALS
WEIGHT: 176 LBS | HEIGHT: 72 IN | SYSTOLIC BLOOD PRESSURE: 122 MMHG | HEART RATE: 65 BPM | TEMPERATURE: 97.5 F | BODY MASS INDEX: 23.84 KG/M2 | DIASTOLIC BLOOD PRESSURE: 74 MMHG | OXYGEN SATURATION: 95 %

## 2023-08-09 DIAGNOSIS — E78.00 HYPERCHOLESTEREMIA: Chronic | ICD-10-CM

## 2023-08-09 DIAGNOSIS — J30.9 ALLERGIC RHINITIS, UNSPECIFIED SEASONALITY, UNSPECIFIED TRIGGER: Chronic | ICD-10-CM

## 2023-08-09 DIAGNOSIS — J45.30 MILD PERSISTENT ASTHMA WITHOUT COMPLICATION: Chronic | ICD-10-CM

## 2023-08-09 DIAGNOSIS — Z00.00 PE (PHYSICAL EXAM), ANNUAL: Primary | ICD-10-CM

## 2023-08-09 LAB
ALBUMIN SERPL-MCNC: 4.5 G/DL (ref 3.5–5.2)
ALBUMIN/GLOB SERPL: 1.9 G/DL
ALP SERPL-CCNC: 49 U/L (ref 39–117)
ALT SERPL-CCNC: 26 U/L (ref 1–41)
APPEARANCE UR: CLEAR
AST SERPL-CCNC: 21 U/L (ref 1–40)
BILIRUB SERPL-MCNC: 0.3 MG/DL (ref 0–1.2)
BILIRUB UR QL STRIP: NEGATIVE
BUN SERPL-MCNC: 18 MG/DL (ref 6–20)
BUN/CREAT SERPL: 16.4 (ref 7–25)
CALCIUM SERPL-MCNC: 9.6 MG/DL (ref 8.6–10.5)
CHLORIDE SERPL-SCNC: 103 MMOL/L (ref 98–107)
CHOLEST SERPL-MCNC: 204 MG/DL (ref 0–200)
CO2 SERPL-SCNC: 28.3 MMOL/L (ref 22–29)
COLOR UR: YELLOW
CREAT SERPL-MCNC: 1.1 MG/DL (ref 0.76–1.27)
EGFRCR SERPLBLD CKD-EPI 2021: 84.9 ML/MIN/1.73
ERYTHROCYTE [DISTWIDTH] IN BLOOD BY AUTOMATED COUNT: 12.6 % (ref 12.3–15.4)
GLOBULIN SER CALC-MCNC: 2.4 GM/DL
GLUCOSE SERPL-MCNC: 97 MG/DL (ref 65–99)
GLUCOSE UR QL STRIP: NEGATIVE
HCT VFR BLD AUTO: 45.6 % (ref 37.5–51)
HDLC SERPL-MCNC: 51 MG/DL (ref 40–60)
HGB BLD-MCNC: 15.7 G/DL (ref 13–17.7)
HGB UR QL STRIP: NEGATIVE
KETONES UR QL STRIP: NEGATIVE
LDLC SERPL CALC-MCNC: 141 MG/DL (ref 0–100)
LEUKOCYTE ESTERASE UR QL STRIP: NEGATIVE
MCH RBC QN AUTO: 30.7 PG (ref 26.6–33)
MCHC RBC AUTO-ENTMCNC: 34.4 G/DL (ref 31.5–35.7)
MCV RBC AUTO: 89.2 FL (ref 79–97)
NITRITE UR QL STRIP: NEGATIVE
PH UR STRIP: 6 [PH] (ref 5–8)
PLATELET # BLD AUTO: 194 10*3/MM3 (ref 140–450)
POTASSIUM SERPL-SCNC: 4.6 MMOL/L (ref 3.5–5.2)
PROT SERPL-MCNC: 6.9 G/DL (ref 6–8.5)
PROT UR QL STRIP: NEGATIVE
RBC # BLD AUTO: 5.11 10*6/MM3 (ref 4.14–5.8)
SODIUM SERPL-SCNC: 140 MMOL/L (ref 136–145)
SP GR UR STRIP: 1.02 (ref 1–1.03)
TRIGL SERPL-MCNC: 68 MG/DL (ref 0–150)
TSH SERPL DL<=0.005 MIU/L-ACNC: 0.95 UIU/ML (ref 0.27–4.2)
UROBILINOGEN UR STRIP-MCNC: NORMAL MG/DL
VLDLC SERPL CALC-MCNC: 12 MG/DL (ref 5–40)
WBC # BLD AUTO: 4.66 10*3/MM3 (ref 3.4–10.8)

## 2023-08-09 PROCEDURE — 99396 PREV VISIT EST AGE 40-64: CPT | Performed by: NURSE PRACTITIONER

## 2023-08-09 RX ORDER — ROSUVASTATIN CALCIUM 5 MG/1
5 TABLET, COATED ORAL DAILY
Qty: 90 TABLET | Refills: 1 | Status: SHIPPED | OUTPATIENT
Start: 2023-08-09

## 2023-08-09 NOTE — PROGRESS NOTES
Subjective   Rush Ruiz is a 42 y.o. male who is here for a physical exam.    History of Present Illness  He reports feeling well, currently managed on allergy injections every 3 weeks. He also takes Singulair nightly along with Symbicort, notes infrequent Albuterol use (also takes Symbicort intermittently).       The following portions of the patient's history were reviewed and updated as appropriate: allergies, current medications, past social history and problem list.    Past Medical History:   Diagnosis Date    Allergic rhinitis     Anxiety     Asthma     intermittent asthma without complications.    Atresia of external auditory canal 1978    Left Ear    Hepatitis C antibody test positive     History of chest pain     History of retinopathy     Hyperlipidemia     Psoriasis     Dr. Milton Pineda         Current Outpatient Medications:     albuterol (PROVENTIL HFA;VENTOLIN HFA) 108 (90 BASE) MCG/ACT inhaler, Inhale 2 puffs Every 4 (Four) Hours As Needed for Wheezing., Disp: 1 inhaler, Rfl: 3    ALPRAZolam (Xanax) 0.25 MG tablet, Take 1 tablet by mouth 3 (Three) Times a Day As Needed for Anxiety., Disp: 45 tablet, Rfl: 0    Bilberry, Vaccinium myrtillus, 60 MG capsule, Take 280 mg by mouth., Disp: , Rfl:     montelukast (SINGULAIR) 10 MG tablet, Take 1 tablet by mouth Daily., Disp: 90 tablet, Rfl: 1    MULTIPLE VITAMINS-MINERALS PO, Take 1 tablet by mouth., Disp: , Rfl:     NON FORMULARY, Allergy injections every 3 weeks, Disp: , Rfl:     rosuvastatin (CRESTOR) 5 MG tablet, Take 1 tablet by mouth Daily., Disp: 90 tablet, Rfl: 1    traZODone (DESYREL) 50 MG tablet, Take 1 tablet by mouth At Night As Needed for Sleep., Disp: 30 tablet, Rfl: 1    budesonide-formoterol (SYMBICORT) 80-4.5 MCG/ACT inhaler, Inhale 2 puffs by mouth twice daily, Disp: 11 g, Rfl: 0    No Known Allergies    Review of Systems   Constitutional:  Negative for activity change, appetite change, chills, diaphoresis, fatigue, fever and  "unexpected weight change.   HENT:  Positive for congestion and postnasal drip. Negative for dental problem, drooling, ear discharge, ear pain, facial swelling, hearing loss, mouth sores, nosebleeds, rhinorrhea, sinus pressure, sore throat, tinnitus and trouble swallowing.    Eyes:  Negative for photophobia, pain, discharge, redness, itching and visual disturbance.   Respiratory:  Positive for cough. Negative for apnea, choking, chest tightness, shortness of breath and wheezing.    Cardiovascular:  Negative for chest pain, palpitations and leg swelling.        No orthopnea, PND, HEIWTT   Gastrointestinal:  Negative for abdominal pain, blood in stool, constipation, diarrhea, nausea and vomiting.   Endocrine: Negative for cold intolerance, heat intolerance, polydipsia and polyuria.   Genitourinary:  Negative for decreased urine volume, dysuria, enuresis, flank pain, frequency, hematuria and urgency.   Musculoskeletal:  Positive for arthralgias and back pain. Negative for gait problem, joint swelling, myalgias, neck pain and neck stiffness.   Skin:  Negative for color change and rash.        No hair changes, no nail changes   Allergic/Immunologic: Positive for environmental allergies. Negative for food allergies and immunocompromised state.   Neurological:  Negative for dizziness, tremors, seizures, syncope, speech difficulty, weakness, light-headedness, numbness and headaches.   Hematological:  Negative for adenopathy. Does not bruise/bleed easily.   Psychiatric/Behavioral:  Positive for sleep disturbance (takes Trazodone very rarely). Negative for agitation, confusion, decreased concentration, dysphoric mood and suicidal ideas. The patient is not nervous/anxious.      Objective   Vitals:    08/09/23 0910   BP: 122/74   Pulse: 65   Temp: 97.5 øF (36.4 øC)   SpO2: 95%   Weight: 79.8 kg (176 lb)   Height: 182.9 cm (72\")     Physical Exam  Constitutional:       General: He is not in acute distress.     Appearance: Normal " appearance. He is not diaphoretic.   HENT:      Head: Normocephalic and atraumatic.      Right Ear: Tympanic membrane, ear canal and external ear normal.      Left Ear: Tympanic membrane and ear canal normal.      Ears:      Comments: Atresia left external ear     Nose: Nose normal. No rhinorrhea.      Mouth/Throat:      Mouth: Mucous membranes are moist.      Pharynx: Oropharynx is clear.   Eyes:      General:         Right eye: No discharge.         Left eye: No discharge.      Conjunctiva/sclera: Conjunctivae normal.   Cardiovascular:      Rate and Rhythm: Normal rate and regular rhythm.      Pulses: Normal pulses.      Heart sounds: Normal heart sounds.   Pulmonary:      Effort: Pulmonary effort is normal.      Breath sounds: Normal breath sounds.   Abdominal:      General: Bowel sounds are normal.      Tenderness: There is no abdominal tenderness.   Musculoskeletal:         General: No swelling or tenderness.      Cervical back: Normal range of motion.      Comments: Surgical scar along with spine; begins at thoracic and extends into lumbar   Skin:     General: Skin is warm and dry.   Neurological:      General: No focal deficit present.      Mental Status: He is alert and oriented to person, place, and time.   Psychiatric:         Mood and Affect: Mood normal.         Behavior: Behavior normal.         Judgment: Judgment normal.       Assessment & Plan   Diagnoses and all orders for this visit:    1. PE (physical exam), annual (Primary)  -     CBC (No Diff)  -     Comprehensive Metabolic Panel  -     Lipid Panel  -     TSH  -     Urinalysis With Microscopic If Indicated (No Culture) - Urine, Clean Catch    2. Hypercholesteremia  Assessment & Plan:  He denies myalgias with rosuvastatin, recheck lipid panel (pt follows vegetarian diet & remains active).    Orders:  -     rosuvastatin (CRESTOR) 5 MG tablet; Take 1 tablet by mouth Daily.  Dispense: 90 tablet; Refill: 1    3. Mild persistent asthma without  complication  Assessment & Plan:  He is taking Singulair daily with Symbicort inh intermittently, notes infrequent Albuterol inh use.      4. Allergic rhinitis, unspecified seasonality, unspecified trigger  Assessment & Plan:  Currently receiving allergy injections every 3 weeks, managed on Singulair daily.          Risk assessment:  He has a family hx (father) of colon cancer and CAD-scheduled for repeat 5 year colonoscopy. Managed on statin therapy.  His Body mass index is 23.87 kg/mý. - He remains active and tries to follow a low-fat, low-cholesterol diet.    Prevention:  Health Maintenance   Topic Date Due    COLORECTAL CANCER SCREENING  12/29/2022    Pneumococcal Vaccine 0-64 (2 - PCV) 01/13/2023    COVID-19 Vaccine (6 - Moderna series) 03/26/2023    ANNUAL PHYSICAL  08/05/2023    INFLUENZA VACCINE  10/01/2023    LIPID PANEL  08/09/2024    TDAP/TD VACCINES (2 - Td or Tdap) 10/08/2025    HEPATITIS C SCREENING  Completed       Discussed healthy lifestyle choices such as maintaining a balanced diet low in carbohydrates and limiting caffeine and alcohol intake.  Recommended routine exercise for bone strength and cardiovascular health.

## 2023-08-18 DIAGNOSIS — J45.30 MILD PERSISTENT ASTHMA WITHOUT COMPLICATION: ICD-10-CM

## 2023-08-18 RX ORDER — BUDESONIDE AND FORMOTEROL FUMARATE DIHYDRATE 80; 4.5 UG/1; UG/1
AEROSOL RESPIRATORY (INHALATION)
Qty: 11 G | Refills: 0 | Status: SHIPPED | OUTPATIENT
Start: 2023-08-18

## 2023-08-19 NOTE — ASSESSMENT & PLAN NOTE
He denies myalgias with rosuvastatin, recheck lipid panel (pt follows vegetarian diet & remains active).

## 2023-08-19 NOTE — ASSESSMENT & PLAN NOTE
He is taking Singulair daily with Symbicort inh intermittently, notes infrequent Albuterol inh use.

## 2023-09-05 DIAGNOSIS — J45.30 MILD PERSISTENT ASTHMA WITHOUT COMPLICATION: ICD-10-CM

## 2023-09-05 RX ORDER — MONTELUKAST SODIUM 10 MG/1
TABLET ORAL
Qty: 90 TABLET | Refills: 1 | Status: SHIPPED | OUTPATIENT
Start: 2023-09-05

## 2023-09-06 ENCOUNTER — HOSPITAL ENCOUNTER (OUTPATIENT)
Facility: HOSPITAL | Age: 43
Setting detail: HOSPITAL OUTPATIENT SURGERY
Discharge: HOME | End: 2023-09-06
Attending: INTERNAL MEDICINE | Admitting: INTERNAL MEDICINE
Payer: COMMERCIAL

## 2023-09-06 ENCOUNTER — ANESTHESIA (OUTPATIENT)
Dept: GASTROENTEROLOGY | Facility: HOSPITAL | Age: 43
End: 2023-09-06
Payer: COMMERCIAL

## 2023-09-06 ENCOUNTER — ANESTHESIA EVENT (OUTPATIENT)
Dept: GASTROENTEROLOGY | Facility: HOSPITAL | Age: 43
End: 2023-09-06
Payer: COMMERCIAL

## 2023-09-06 ENCOUNTER — ON CAMPUS - OUTPATIENT (OUTPATIENT)
Dept: URBAN - METROPOLITAN AREA HOSPITAL 114 | Facility: HOSPITAL | Age: 43
End: 2023-09-06

## 2023-09-06 VITALS
OXYGEN SATURATION: 97 % | DIASTOLIC BLOOD PRESSURE: 64 MMHG | SYSTOLIC BLOOD PRESSURE: 97 MMHG | HEIGHT: 73 IN | BODY MASS INDEX: 23.46 KG/M2 | RESPIRATION RATE: 22 BRPM | WEIGHT: 177 LBS | HEART RATE: 50 BPM

## 2023-09-06 DIAGNOSIS — K57.30 DIVERTICULOSIS OF LARGE INTESTINE WITHOUT PERFORATION OR ABS: ICD-10-CM

## 2023-09-06 DIAGNOSIS — Z80.0 FAMILY HISTORY OF MALIGNANT NEOPLASM OF DIGESTIVE ORGANS: ICD-10-CM

## 2023-09-06 DIAGNOSIS — K64.0 FIRST DEGREE HEMORRHOIDS: ICD-10-CM

## 2023-09-06 DIAGNOSIS — Z12.11 ENCOUNTER FOR SCREENING FOR MALIGNANT NEOPLASM OF COLON: ICD-10-CM

## 2023-09-06 PROCEDURE — 25010000002 PROPOFOL 10 MG/ML EMULSION: Performed by: STUDENT IN AN ORGANIZED HEALTH CARE EDUCATION/TRAINING PROGRAM

## 2023-09-06 PROCEDURE — 45378 DIAGNOSTIC COLONOSCOPY: CPT | Mod: 33 | Performed by: INTERNAL MEDICINE

## 2023-09-06 RX ORDER — SODIUM CHLORIDE, SODIUM LACTATE, POTASSIUM CHLORIDE, CALCIUM CHLORIDE 600; 310; 30; 20 MG/100ML; MG/100ML; MG/100ML; MG/100ML
30 INJECTION, SOLUTION INTRAVENOUS CONTINUOUS PRN
Status: DISCONTINUED | OUTPATIENT
Start: 2023-09-06 | End: 2023-09-06 | Stop reason: HOSPADM

## 2023-09-06 RX ORDER — LIDOCAINE HYDROCHLORIDE 20 MG/ML
INJECTION, SOLUTION INFILTRATION; PERINEURAL AS NEEDED
Status: DISCONTINUED | OUTPATIENT
Start: 2023-09-06 | End: 2023-09-06 | Stop reason: SURG

## 2023-09-06 RX ORDER — PROPOFOL 10 MG/ML
VIAL (ML) INTRAVENOUS AS NEEDED
Status: DISCONTINUED | OUTPATIENT
Start: 2023-09-06 | End: 2023-09-06 | Stop reason: SURG

## 2023-09-06 RX ADMIN — LIDOCAINE HYDROCHLORIDE 40 MG: 20 INJECTION, SOLUTION INFILTRATION; PERINEURAL at 08:51

## 2023-09-06 RX ADMIN — SODIUM CHLORIDE, POTASSIUM CHLORIDE, SODIUM LACTATE AND CALCIUM CHLORIDE 30 ML/HR: 600; 310; 30; 20 INJECTION, SOLUTION INTRAVENOUS at 08:27

## 2023-09-06 RX ADMIN — PROPOFOL 50 MG: 10 INJECTION, EMULSION INTRAVENOUS at 08:51

## 2023-09-06 RX ADMIN — PROPOFOL 120 MCG/KG/MIN: 10 INJECTION, EMULSION INTRAVENOUS at 08:52

## 2023-09-06 NOTE — TELEPHONE ENCOUNTER
Left message with patient that I would like to begin Atorvastatin but I will need his pharmacy information. I do not recommend taking Atorvastatin the red yeast rice. If you talk to him let me know so we can send in the prescription. Thanks.   Patient contacted and results given.  CXR and lab orders placed.   Patient verbalized understanding.

## 2023-09-06 NOTE — ANESTHESIA POSTPROCEDURE EVALUATION
Patient: Rush Ruiz    Procedure Summary       Date: 09/06/23 Room / Location:  [facility]    Anesthesia Start: 0847 Anesthesia Stop: 0913    Procedure: COLONOSCOPY to cecum Diagnosis:     Surgeons: Kwaku Ruiz MD Provider: Scarlett De La Garza MD    Anesthesia Type: MAC ASA Status: 2            Anesthesia Type: MAC    Vitals  Vitals Value Taken Time   BP 97/64 09/06/23 0932   Temp     Pulse 50 09/06/23 0932   Resp 22 09/06/23 0932   SpO2 97 % 09/06/23 0932           Post Anesthesia Care and Evaluation    Patient location during evaluation: PHASE II  Patient participation: complete - patient participated  Level of consciousness: awake  Pain management: adequate    Airway patency: patent  Anesthetic complications: No anesthetic complications    Cardiovascular status: acceptable  Respiratory status: acceptable  Hydration status: acceptable

## 2023-09-06 NOTE — ANESTHESIA PREPROCEDURE EVALUATION
Anesthesia Evaluation     Patient summary reviewed and Nursing notes reviewed   no history of anesthetic complications:   NPO Solid Status: > 2 hours  NPO Liquid Status: > 8 hours           Airway   Mallampati: I  TM distance: >3 FB  Neck ROM: full  No difficulty expected  Dental - normal exam     Pulmonary - normal exam   (+) a smoker Former, asthma,  (-) COPD  Cardiovascular   Exercise tolerance: good (4-7 METS)    ECG reviewed  Rhythm: regular    (+) hyperlipidemia  (-) past MI, angina, cardiac stents      Neuro/Psych  (+) psychiatric history Anxiety  (-) seizures, CVA  GI/Hepatic/Renal/Endo    (-) GERD, liver disease, no renal disease    Musculoskeletal     (+) back pain      ROS comment: Scoliosis  Abdominal    Substance History - negative use     OB/GYN negative ob/gyn ROS         Other - negative ROS                       Anesthesia Plan    ASA 2     MAC     (I have reviewed the patient's history and chart with the patient, including all pertinent laboratory results and imaging. I have explained the risks of monitored anesthesia care including but not limited to respiratory depression, possible need for airway intervention, or possible intra-op recall. )  intravenous induction     Anesthetic plan, risks, benefits, and alternatives have been provided, discussed and informed consent has been obtained with: patient.  Pre-procedure education provided    CODE STATUS:

## 2023-09-06 NOTE — H&P
[facility]   HISTORY AND PHYSICAL    Patient Name: Rush Ruiz  : 1980  MRN: [id number]  Primary Care Physician:  Kristin Zapien APRN  Date of admission: 2023    Subjective   Subjective     Chief Complaint: fhx crc    History of Present Illness  Colon cancer in his father   Review of Systems   All other systems reviewed and are negative.     Personal History     Past Medical History:   Diagnosis Date    Allergic rhinitis     Anxiety     Asthma     intermittent asthma without complications.    Atresia of external auditory canal 1978    Left Ear    Colon polyps     Hepatitis C antibody test positive     History of chest pain     History of retinopathy     Hyperlipidemia     Psoriasis     Dr. Milton Pineda       Past Surgical History:   Procedure Laterality Date    BACK SURGERY      COLONOSCOPY N/A 10/2012    Q 5 years-Dr. Ruiz    COLONOSCOPY N/A 2017    Procedure: COLONOSCOPY TO CECUM;  Surgeon: Kwaku Ruiz MD;  Location: [facility] END[facility];  Service:     EYE SURGERY Bilateral 2015    SPINE SURGERY      Correct Scoliosis @ age 13    VASECTOMY  2017       Family History: family history includes Colon cancer (age of onset: 64) in his father; Colon polyps in his father; Gout in his father; Heart attack in his paternal uncle; Heart attack (age of onset: 38) in his father; Heart disease in his father; No Known Problems in his mother. Otherwise pertinent FHx was reviewed and not pertinent to current issue.    Social History:  reports that he quit smoking about 8 years ago. His smoking use included cigarettes. He has a 30.00 pack-year smoking history. He has been exposed to tobacco smoke. He has never used smokeless tobacco. He reports current alcohol use. He reports that he does not use drugs.    Home Medications:  ALPRAZolam, Bilberry (Vaccinium myrtillus), NON FORMULARY, albuterol sulfate HFA, budesonide-formoterol, montelukast, multivitamin with minerals, rosuvastatin, and  traZODone    Allergies:  No Known Allergies    Objective    Objective     Vitals:   Heart Rate:  [44] 44  Resp:  [16] 16  BP: (109)/(74) 109/74    Physical Exam  HENT:      Right Ear: External ear normal.      Left Ear: External ear normal.      Mouth/Throat:      Pharynx: Oropharynx is clear.   Eyes:      Conjunctiva/sclera: Conjunctivae normal.   Cardiovascular:      Pulses: Normal pulses.   Pulmonary:      Effort: Pulmonary effort is normal.   Abdominal:      General: Abdomen is flat.   Skin:     General: Skin is warm and dry.   Neurological:      General: No focal deficit present.      Mental Status: He is alert.   Psychiatric:         Mood and Affect: Mood normal.       Result Review    Result Review:  I have personally reviewed the results from the time of this admission to 9/6/2023 08:50 EDT and agree with these findings:  []  Laboratory list / accordion  []  Microbiology  []  Radiology  []  EKG/Telemetry   []  Cardiology/Vascular   []  Pathology  []  Old records  []  Other:  Most notable findings include:       Assessment & Plan   Assessment / Plan     Brief Patient Summary:  Rush Ruiz is a 42 y.o. male who   Family history of colon cancer     Active Hospital Problems:  There are no active hospital problems to display for this patient.    Plan: Colonoscopy risks, alternatives and benefits discussed with patient and the patient is agreeable to having procedure done.      DVT prophylaxis:  No DVT prophylaxis order currently exists.    CODE STATUS:       Admission Status:  I believe this patient meets outpatient  status.    Kwaku Ruiz MD

## 2024-02-14 ENCOUNTER — OFFICE VISIT (OUTPATIENT)
Dept: INTERNAL MEDICINE | Facility: CLINIC | Age: 44
End: 2024-02-14
Payer: COMMERCIAL

## 2024-02-14 VITALS
BODY MASS INDEX: 23.8 KG/M2 | WEIGHT: 179.6 LBS | HEIGHT: 73 IN | HEART RATE: 45 BPM | SYSTOLIC BLOOD PRESSURE: 110 MMHG | DIASTOLIC BLOOD PRESSURE: 72 MMHG | OXYGEN SATURATION: 95 %

## 2024-02-14 DIAGNOSIS — E78.00 HYPERCHOLESTEREMIA: Primary | Chronic | ICD-10-CM

## 2024-02-14 DIAGNOSIS — J45.30 MILD PERSISTENT ASTHMA WITHOUT COMPLICATION: Chronic | ICD-10-CM

## 2024-02-14 DIAGNOSIS — J30.9 ALLERGIC RHINITIS, UNSPECIFIED SEASONALITY, UNSPECIFIED TRIGGER: Chronic | ICD-10-CM

## 2024-02-14 PROBLEM — F51.01 PRIMARY INSOMNIA: Chronic | Status: RESOLVED | Noted: 2020-07-08 | Resolved: 2024-02-14

## 2024-02-14 PROBLEM — L40.9 PSORIASIS: Status: ACTIVE | Noted: 2024-02-14

## 2024-02-14 PROBLEM — J45.998 OTHER ASTHMA: Status: ACTIVE | Noted: 2024-02-14

## 2024-02-14 LAB
ALBUMIN SERPL-MCNC: 4.8 G/DL (ref 3.5–5.2)
ALBUMIN/GLOB SERPL: 2.2 G/DL
ALP SERPL-CCNC: 49 U/L (ref 39–117)
ALT SERPL-CCNC: 27 U/L (ref 1–41)
AST SERPL-CCNC: 27 U/L (ref 1–40)
BILIRUB SERPL-MCNC: 0.5 MG/DL (ref 0–1.2)
BUN SERPL-MCNC: 16 MG/DL (ref 6–20)
BUN/CREAT SERPL: 17.2 (ref 7–25)
CALCIUM SERPL-MCNC: 9.4 MG/DL (ref 8.6–10.5)
CHLORIDE SERPL-SCNC: 103 MMOL/L (ref 98–107)
CHOLEST SERPL-MCNC: 151 MG/DL (ref 0–200)
CO2 SERPL-SCNC: 26.9 MMOL/L (ref 22–29)
CREAT SERPL-MCNC: 0.93 MG/DL (ref 0.76–1.27)
EGFRCR SERPLBLD CKD-EPI 2021: 103.2 ML/MIN/1.73
GLOBULIN SER CALC-MCNC: 2.2 GM/DL
GLUCOSE SERPL-MCNC: 92 MG/DL (ref 65–99)
HDLC SERPL-MCNC: 52 MG/DL (ref 40–60)
LDLC SERPL CALC-MCNC: 86 MG/DL (ref 0–100)
POTASSIUM SERPL-SCNC: 4.4 MMOL/L (ref 3.5–5.2)
PROT SERPL-MCNC: 7 G/DL (ref 6–8.5)
SODIUM SERPL-SCNC: 141 MMOL/L (ref 136–145)
TRIGL SERPL-MCNC: 66 MG/DL (ref 0–150)
VLDLC SERPL CALC-MCNC: 13 MG/DL (ref 5–40)

## 2024-02-14 PROCEDURE — 99214 OFFICE O/P EST MOD 30 MIN: CPT | Performed by: NURSE PRACTITIONER

## 2024-02-14 NOTE — PROGRESS NOTES
"Chief Complaint  Anxiety (6 month follow up), Allergies, Asthma, and Hyperlipidemia  Subjective        Rush Ruiz presents to [facility] for f/u regarding allergies, hyperlipidemia and asthma.    History of Present Illness    Seasonal allergies/asthma  The patient stopped taking Singulair 6 weeks ago after reading the black box warning on it as far as mental health is concerned. He has been struggling with it since he turned 38 years old. He has had intrusive thoughts, but no suicidal ideation. He wonders if it is due to the Singulair that he was taking. His symptoms are well controlled on the allergy injections once a month and he is working with his allergist. He does not use Symbicort every day. He uses albuterol irregularly. He denies any allergy symptoms with the injections. He rarely has headaches.    Sleep  He has not used trazodone for approximately a year as his sleeping is under control. Trazadone made him groggy, The patient currently wakes up in the middle of the night and eats. He feels that he is half awake.    Hyperlipidemia  He is taking rosuvastatin for his cholesterol. His cholesterol was elevated at the last visit.    Constipation  He has been having more digestive issues. It happened around the holidays previously. He attributed it to eating junk food. He became constipated. He obtained a \"hydrocolonic,\" which seemed to help. His bowels started moving regularly after this. Recently, however, his bowel movements are different. Last week there was fluid in his stools, and this week, they are drier. He denies any blood in his stool. He is consistent with his diet. He eats the same 2 things for breakfast every day. Depending on how busy he becomes, it would determine how much processed food he eats. Most of the time, he cooks at home. He is not consistent with drinking water. He takes a probiotic every day. He has been taking a \"candida cleanse.\" He has noticed a " "white tongue. He denies any pain.    Back pain  The patient's back pain has resolved. He is back to performing his preinjury workouts.    Social history  The patient quit drinking alcohol in 12/2022, and he feels much more rested and calm.    Immunizations  The patient is up to date on his influenza and tetanus vaccines.    Current medications  Symbicort  Albuterol  Rosuvastatin  Allergy injections once a month  Multivitamin      Objective   Vital Signs:  /72 (BP Location: Left arm, Patient Position: Sitting, Cuff Size: Adult)   Pulse (!) 45   Ht 185.4 cm (73\")   Wt 81.5 kg (179 lb 9.6 oz)   SpO2 95%   BMI 23.70 kg/m²   Estimated body mass index is 23.7 kg/m² as calculated from the following:    Height as of this encounter: 185.4 cm (73\").    Weight as of this encounter: 81.5 kg (179 lb 9.6 oz).    BMI is within normal parameters. No other follow-up for BMI required.      Physical Exam  Constitutional:       Appearance: He is well-developed. He is not ill-appearing.   HENT:      Head: Normocephalic.      Right Ear: Hearing, tympanic membrane and external ear normal.      Left Ear: Hearing, tympanic membrane and external ear normal.      Ears:      Comments: Atresia left ear canal     Nose: Nose normal. No nasal deformity, mucosal edema or rhinorrhea.      Right Sinus: No maxillary sinus tenderness or frontal sinus tenderness.      Left Sinus: No maxillary sinus tenderness or frontal sinus tenderness.      Mouth/Throat:      Dentition: Normal dentition.      Pharynx: Posterior oropharyngeal erythema present.   Eyes:      General: Lids are normal.         Right eye: No discharge.         Left eye: No discharge.      Conjunctiva/sclera: Conjunctivae normal.      Right eye: No exudate.     Left eye: No exudate.  Neck:      Thyroid: No thyroid mass or thyromegaly.      Vascular: No carotid bruit.      Trachea: Trachea normal.   Cardiovascular:      Rate and Rhythm: Regular rhythm.      Pulses: Normal pulses. "      Heart sounds: Normal heart sounds. No murmur heard.  Pulmonary:      Effort: No respiratory distress.      Breath sounds: Normal breath sounds. No decreased breath sounds, wheezing, rhonchi or rales.   Abdominal:      General: Bowel sounds are normal.      Palpations: Abdomen is soft.      Tenderness: There is no abdominal tenderness.   Musculoskeletal:      Cervical back: Normal range of motion. No edema.   Lymphadenopathy:      Head:      Right side of head: No submental, submandibular, tonsillar, preauricular, posterior auricular or occipital adenopathy.      Left side of head: No submental, submandibular, tonsillar, preauricular, posterior auricular or occipital adenopathy.   Skin:     General: Skin is warm and dry.      Nails: There is no clubbing.   Neurological:      Mental Status: He is alert.   Psychiatric:         Behavior: Behavior is cooperative.        Result Review :  The following data was reviewed by: WESLEY Macdonald on 02/14/2024:  Common labs          8/9/2023    09:53 2/14/2024    09:45   Common Labs   Glucose 97  92    BUN 18  16    Creatinine 1.10  0.93    Sodium 140  141    Potassium 4.6  4.4    Chloride 103  103    Calcium 9.6  9.4    Total Protein 6.9  7.0    Albumin 4.5  4.8    Total Bilirubin 0.3  0.5    Alkaline Phosphatase 49  49    AST (SGOT) 21  27    ALT (SGPT) 26  27    WBC 4.66     Hemoglobin 15.7     Hematocrit 45.6     Platelets 194     Total Cholesterol 204  151    Triglycerides 68  66    HDL Cholesterol 51  52    LDL Cholesterol  141  86             Labs were reviewed with the patient. His last labs obtained showed  dL         Assessment and Plan   Diagnoses and all orders for this visit:    1. Hypercholesteremia (Primary)  Assessment & Plan:   Lipid abnormalities are stable (elevated with previous labs)    Plan:  Continue same medication/s without change.    He is tolerating rosuvastatin without myalgias.    Discussed medication dosage, use, side effects, and  goals of treatment in detail.    Counseled patient on lifestyle modifications to help control hyperlipidemia.     Patient Treatment Goals:   LDL goal is under 100    Followup in 6 months.    Orders:  -     Lipid Panel  -     Comprehensive Metabolic Panel    2. Allergic rhinitis, unspecified seasonality, unspecified trigger  Assessment & Plan:  He will continue his allergy injection (receives monthly), continue to monitor. He is doing well without Singulair well thus far.      3. Mild persistent asthma without complication  Assessment & Plan:  He does not use Symbicort daily due to good control of his symptoms, notes infrequent albuterol inh use            Sleep issues.  His sleep is under control. Trazodone was discontinued.    Digestive issues.  He was advised to drink plenty of water. If it becomes more of an issue, he will let me know.         Follow Up   Return in about 6 months (around 8/14/2024) for Annual physical.  Patient was given instructions and counseling regarding his condition or for health maintenance advice. Please see specific information pulled into the AVS if appropriate.      Transcribed from ambient dictation for WESLEY Macdonald by Swati Roberson.  02/14/24   11:08 EST    Patient or patient representative verbalized consent to the visit recording.  I have personally performed the services described in this document as transcribed by the above individual, and it is both accurate and complete.

## 2024-02-25 PROBLEM — J45.998 OTHER ASTHMA: Status: RESOLVED | Noted: 2024-02-14 | Resolved: 2024-02-25

## 2024-02-25 NOTE — ASSESSMENT & PLAN NOTE
Lipid abnormalities are stable (elevated with previous labs)    Plan:  Continue same medication/s without change.    He is tolerating rosuvastatin without myalgias.    Discussed medication dosage, use, side effects, and goals of treatment in detail.    Counseled patient on lifestyle modifications to help control hyperlipidemia.     Patient Treatment Goals:   LDL goal is under 100    Followup in 6 months.

## 2024-02-25 NOTE — ASSESSMENT & PLAN NOTE
He will continue his allergy injection (receives monthly), continue to monitor. He is doing well without Singulair well thus far.

## 2024-02-25 NOTE — ASSESSMENT & PLAN NOTE
He does not use Symbicort daily due to good control of his symptoms, notes infrequent albuterol inh use

## 2024-03-10 DIAGNOSIS — E78.00 HYPERCHOLESTEREMIA: Chronic | ICD-10-CM

## 2024-03-11 RX ORDER — ROSUVASTATIN CALCIUM 5 MG/1
5 TABLET, COATED ORAL DAILY
Qty: 90 TABLET | Refills: 1 | Status: SHIPPED | OUTPATIENT
Start: 2024-03-11

## 2024-03-26 ENCOUNTER — TELEPHONE (OUTPATIENT)
Dept: INTERNAL MEDICINE | Facility: CLINIC | Age: 44
End: 2024-03-26
Payer: COMMERCIAL

## 2024-03-26 NOTE — TELEPHONE ENCOUNTER
Caller: Rush Ruiz    Relationship: Self    Best call back number: [phone]    What specialty or service is being requested: DERMATOLOGIST      Any additional details: PATIENT STATES HE HAS A MOLE ON TOP OF HIS HEAD THAT IS ITCHY. PATIENT'S REGULAR DERMATOLOGIST IS OUT OF THE OFFICE UNTIL JUNE

## 2024-04-09 ENCOUNTER — TELEPHONE (OUTPATIENT)
Dept: INTERNAL MEDICINE | Facility: CLINIC | Age: 44
End: 2024-04-09
Payer: COMMERCIAL

## 2024-04-09 NOTE — TELEPHONE ENCOUNTER
Caller: Rush Ruiz    Relationship: Self    Best call back number: [phone]    What form or medical record are you requesting: FORM STATING MEDICAL NEED FOR MESSAGE THERAPY     Who is requesting this form or medical record from you: PATIENT     How would you like to receive the form or medical records (pick-up, mail, fax): E-MAIL    E-MAIL:   [email or web address]     Timeframe paperwork needed: ASAP    Additional notes:     PATIENT SAID BELLA QUIROZ HAD SUPPLIED A LETTER TO HIM LAST YEAR STATING THERE WAS A MEDICAL NEED FOR HIM TO GET MESSAGE THERAPY, HE USED THAT ON HIS TAXES, AND NEEDS A NEW ONE FOR THIS YEAR     HE IS REQUESTING IT BE E-MAILED TO HIM

## 2024-04-10 NOTE — TELEPHONE ENCOUNTER
Please notify I have sent this through [employer] but mail him a copy as well if he needs this. Thanks.

## 2024-06-21 ENCOUNTER — OFFICE VISIT (OUTPATIENT)
Dept: INTERNAL MEDICINE | Facility: CLINIC | Age: 44
End: 2024-06-21
Payer: COMMERCIAL

## 2024-06-21 VITALS
WEIGHT: 181 LBS | HEART RATE: 53 BPM | BODY MASS INDEX: 23.88 KG/M2 | OXYGEN SATURATION: 98 % | TEMPERATURE: 97.7 F | DIASTOLIC BLOOD PRESSURE: 90 MMHG | SYSTOLIC BLOOD PRESSURE: 126 MMHG

## 2024-06-21 DIAGNOSIS — L40.9 PSORIASIS: ICD-10-CM

## 2024-06-21 DIAGNOSIS — K59.00 CONSTIPATION, UNSPECIFIED CONSTIPATION TYPE: ICD-10-CM

## 2024-06-21 DIAGNOSIS — R58 INTERNAL HEMORRHAGE: ICD-10-CM

## 2024-06-21 DIAGNOSIS — K62.89 RECTAL PAIN: Primary | ICD-10-CM

## 2024-06-21 PROCEDURE — 99214 OFFICE O/P EST MOD 30 MIN: CPT

## 2024-06-21 RX ORDER — HYDROCORTISONE ACETATE 25 MG/1
25 SUPPOSITORY RECTAL 2 TIMES DAILY
Qty: 14 SUPPOSITORY | Refills: 0 | Status: SHIPPED | OUTPATIENT
Start: 2024-06-21 | End: 2024-06-28

## 2024-06-21 RX ORDER — POLYETHYLENE GLYCOL 3350 17 G/17G
17 POWDER, FOR SOLUTION ORAL DAILY PRN
Qty: 100 PACKET | Refills: 0 | Status: SHIPPED | OUTPATIENT
Start: 2024-06-21

## 2024-06-21 RX ORDER — DOCUSATE SODIUM 250 MG
250 CAPSULE ORAL DAILY
Qty: 120 CAPSULE | Refills: 0 | Status: SHIPPED | OUTPATIENT
Start: 2024-06-21

## 2024-06-21 NOTE — PROGRESS NOTES
Chief Complaint  Rectal Problems (Soreness/tenderness x4 months, increased bothersome )  Answers submitted by the patient for this visit:  Primary Reason for Visit (Submitted on 6/20/2024)  What is the primary reason for your visit?: Other  Other (Submitted on 6/20/2024)  Please describe your symptoms.: Rectal soreness which has persisted for about 3 months or so  Have you had these symptoms before?: No  How long have you been having these symptoms?: Greater than 2 weeks  Please list any medications you are currently taking for this condition.: none  Please describe any probable cause for these symptoms. : no clue    Subjective        Rush Ruiz presents to [facility]  History of Present Illness  Mr. Ruiz is a patient of WESLEY Beavers. He is here today with complaints of rectal pain. He states it Started a few months ago. He describes the pain as Not sharp more soreness to touch, he is complaining of diarrhea and constipation. He used be have consistent bowel movements but stopped drinking the last 18 months and began to become constipated. He has never had a external hemorrhoid. He has psoriasis that he states is more internal issues so he uses preparation H wipes for a long time and that usually helps. He has a history of internal hemorrhoids per his last coloscopy in 2023. Feels it is tender to touch in top right hand corner. Denies fever and chills or seeing blood when he wipes . He states he is a vegetarian and he is eating lost of fiber.   Rectal Pain  This is a new problem. The current episode started more than 1 month ago. The problem occurs intermittently. The problem has been gradually worsening. Associated symptoms include a change in bowel habit. Pertinent negatives include no abdominal pain, anorexia, arthralgias, chest pain, chills, congestion, coughing, diaphoresis, fatigue, fever, nausea, rash, urinary symptoms or weakness. Nothing aggravates the symptoms.  "Treatments tried: preperation H wipes. The treatment provided mild relief.         Objective   Vital Signs:  /90 (BP Location: Right arm, Patient Position: Sitting, Cuff Size: Adult)   Pulse 53   Temp 97.7 °F (36.5 °C) (Temporal)   Wt 82.1 kg (181 lb)   SpO2 98%   BMI 23.88 kg/m²   Estimated body mass index is 23.88 kg/m² as calculated from the following:    Height as of 2/14/24: 185.4 cm (73\").    Weight as of this encounter: 82.1 kg (181 lb).       BMI is within normal parameters. No other follow-up for BMI required.      Physical Exam  Vitals reviewed.   Constitutional:       Appearance: Normal appearance.   HENT:      Head: Normocephalic.   Cardiovascular:      Rate and Rhythm: Normal rate and regular rhythm.      Pulses: Normal pulses.      Heart sounds: Normal heart sounds.   Pulmonary:      Effort: Pulmonary effort is normal.      Breath sounds: Normal breath sounds.   Abdominal:      General: Abdomen is flat. Bowel sounds are normal.      Palpations: Abdomen is soft.   Genitourinary:     Comments: He does not want an examination today.   Skin:     General: Skin is warm and dry.      Capillary Refill: Capillary refill takes less than 2 seconds.   Neurological:      General: No focal deficit present.      Mental Status: He is alert.   Psychiatric:         Mood and Affect: Mood normal.         Behavior: Behavior normal.        Result Review :                     Assessment and Plan     Diagnoses and all orders for this visit:    1. Rectal pain (Primary)  -     Ambulatory Referral to General Surgery    2. Psoriasis    3. Constipation, unspecified constipation type  -     docusate sodium (COLACE) 250 MG capsule; Take 1 capsule by mouth Daily.  Dispense: 120 capsule; Refill: 0  -     polyethylene glycol (MIRALAX) 17 g packet; Take 17 g by mouth Daily As Needed (for constipation).  Dispense: 100 packet; Refill: 0    4. Internal hemorrhage  -     hydrocortisone (ANUSOL-HC) 25 MG suppository; Insert 1 " suppository into the rectum 2 (Two) Times a Day for 7 days.  Dispense: 14 suppository; Refill: 0    Please follow up with Dr Fox for rectal pain for concerns of internal Hemorid vs anal fissure. Please review added information under the Patient Instructions portion of your print out. Please make sure  over the counter medications: Miralax, Senna and increase water intake.     Thank you for allowing us to care for you,  WESLEY Freire       I spent 36 minutes caring for Rush on this date of service. This time includes time spent by me in the following activities:preparing for the visit, reviewing tests, obtaining and/or reviewing a separately obtained history, performing a medically appropriate examination and/or evaluation , counseling and educating the patient/family/caregiver, ordering medications, tests, or procedures, documenting information in the medical record, independently interpreting results and communicating that information with the patient/family/caregiver, care coordination, and reviewing diet intake and OTC treatments  Follow Up     Return if symptoms worsen or fail to improve.  Patient was given instructions and counseling regarding his condition or for health maintenance advice. Please see specific information pulled into the AVS if appropriate.

## 2024-06-21 NOTE — PATIENT INSTRUCTIONS
Hemorrhoids    Hemorrhoids are swollen veins that may form:  In the butt (rectum). These are called internal hemorrhoids.  Around the opening of the butt (anus). These are called external hemorrhoids.  Most hemorrhoids do not cause very bad problems. They often get better with changes to your lifestyle and what you eat.  What are the causes?  Having trouble pooping (constipation) or watery poop (diarrhea).  Pushing too hard when you poop.  Pregnancy.  Being very overweight (obese).  Sitting for too long.  Riding a bike for a long time.  Heavy lifting or other things that take a lot of effort.  Anal sex.  What are the signs or symptoms?  Pain.  Itching or soreness in the butt.  Bleeding from the butt.  Leaking poop.  Swelling.  One or more lumps around the opening of your butt.  How is this treated?  In most cases, hemorrhoids can be treated at home. You may be told to:  Change what you eat.  Make changes to your lifestyle.  If these treatments do not help, you may need to have a procedure done. Your doctor may need to:  Place rubber bands at the bottom of the hemorrhoids to make them fall off.  Put medicine into the hemorrhoids to shrink them.  Shine a type of light on the hemorrhoids to cause them to fall off.  Do surgery to get rid of the hemorrhoids.  Follow these instructions at home:  Medicines  Take over-the-counter and prescription medicines only as told by your doctor.  Use creams with medicine in them or medicines that you put in your butt as told by your doctor.  Eating and drinking    Eat foods that have a lot of fiber in them. These include whole grains, beans, nuts, fruits, and vegetables.  Ask your doctor about taking products that have fiber added to them (fibersupplements).  Take in less fat. You can do this by:  Eating low-fat dairy products.  Eating less red meat.  Staying away from processed foods.  Drink enough fluid to keep your pee (urine) pale yellow.  Managing pain and swelling    Take a  warm-water bath (sitz bath) for 20 minutes to ease pain. Do this 3-4 times a day. You may do this in a bathtub. You may also use a portable sitz bath that fits over the toilet.  If told, put ice on the painful area. It may help to use ice between your warm baths.  Put ice in a plastic bag.  Place a towel between your skin and the bag.  Leave the ice on for 20 minutes, 2-3 times a day.  If your skin turns bright red, take off the ice right away to prevent skin damage. The risk of damage is higher if you cannot feel pain, heat, or cold.  General instructions  Exercise. Ask your doctor how much and what kind of exercise is best for you.  Go to the bathroom when you need to poop. Do not wait.  Try not to push too hard when you poop.  Keep your butt dry and clean. Use wet toilet paper or moist towelettes after you poop.  Do not sit on the toilet for a long time.  Contact a doctor if:  You have pain and swelling that do not get better with treatment.  You have trouble pooping.  You cannot poop.  You have pain or swelling outside the area of the hemorrhoids.  Get help right away if:  You have bleeding from the butt that will not stop.  This information is not intended to replace advice given to you by your health care provider. Make sure you discuss any questions you have with your health care provider.  Document Revised: 08/30/2023 Document Reviewed: 08/30/2023

## 2024-07-01 ENCOUNTER — OFFICE VISIT (OUTPATIENT)
Dept: SURGERY | Facility: CLINIC | Age: 44
End: 2024-07-01
Payer: COMMERCIAL

## 2024-07-01 VITALS
HEIGHT: 73 IN | SYSTOLIC BLOOD PRESSURE: 120 MMHG | DIASTOLIC BLOOD PRESSURE: 88 MMHG | BODY MASS INDEX: 24.09 KG/M2 | WEIGHT: 181.8 LBS

## 2024-07-01 DIAGNOSIS — K62.89 RECTAL PAIN: Primary | ICD-10-CM

## 2024-07-01 PROCEDURE — 99203 OFFICE O/P NEW LOW 30 MIN: CPT | Performed by: SURGERY

## 2024-07-01 NOTE — PROGRESS NOTES
Cc: Rectal pain    History of presenting illness:   This is a very nice, generally healthy 43-year-old gentleman who presents with intermittent rectal pain present over about the last 6 months.  He says that before that he really did not notice any trouble but since then has had some intermittent trouble with constipation alternating with diarrhea and will sometimes have pain after a bowel movement which will linger.  He has had a sensation on the right side of his anus some discomfort and stinging, no real bleeding and no pain during bowel movements.  He was recently started on MiraLAX as well as Anusol and does feel that he has been doing slightly better.    Past Medical History: Asthma, anxiety, hyperlipidemia, psoriasis    Past Surgical History: Colonoscopy most recently done September 2023, vasectomy 20, back [id number], eye surgery    Medications: Albuterol, Xanax, Symbicort, multivitamin, Crestor, also has recently started MiraLAX, docusate and Anusol    Allergies: None known    Social History: He is a former smoker who quit in 2015    Family History: Positive for colon cancer in his father    Review of Systems:  Constitutional: Denies fever, chills, change in weight  Neck: no swollen glands or dysphagia or odynophagia  Respiratory: negative for SOB, cough, hemoptysis or wheezing  Cardiovascular: negative for chest pain, palpitations or peripheral edema  Gastrointestinal: Positive for anal pain, denies rectal bleeding, intermittent positive constipation and diarrhea      Physical Exam:  BMI: 24.0  General: alert and oriented, appropriate, no acute distress  Eyes: No scleral icterus, extraocular movements are intact  Neck: Supple without lymphadenopathy or thyromegaly, trachea is in the midline  Respiratory: There is good bilateral chest expansion, no use of accessory muscles is noted  Cardiovascular: No jugular venous distention or peripheral edema is seen  Gastrointestinal: Soft and benign, no mass or hernia  noted  Rectal: External anal exam is normal without any obvious prolapsing hemorrhoids.  Digital exam is relatively unremarkable with no significant pain, no mass noted, no fissure identified    Laboratory data: No recent relevant data    Imaging data: No recent relevant data      Assessment and plan:   -Anal pain likely related to internal hemorrhoid, clinically improved to some extent with medical measures  -He is up-to-date on his colonoscopy (positive family history of colon cancer  -I recommend continued conservative measures with Anusol, MiraLAX and stool softeners as needed.  Recommend addition of fiber supplementation (lifelong)  -At the moment no indication for any surgical intervention, he may follow-up as needed for further questions in this regard.      Chiki Osorio MD, FACS  General, Minimally Invasive and Endoscopic Surgery  [facility]    [address], [address]  [place], [place], [address]  P: [phone]  F: [phone]     BMI is within normal parameters. No other follow-up for BMI required.

## 2024-08-19 ENCOUNTER — OFFICE VISIT (OUTPATIENT)
Dept: INTERNAL MEDICINE | Facility: CLINIC | Age: 44
End: 2024-08-19
Payer: COMMERCIAL

## 2024-08-19 VITALS
OXYGEN SATURATION: 98 % | BODY MASS INDEX: 24.68 KG/M2 | HEIGHT: 73 IN | DIASTOLIC BLOOD PRESSURE: 84 MMHG | WEIGHT: 186.2 LBS | HEART RATE: 59 BPM | SYSTOLIC BLOOD PRESSURE: 120 MMHG

## 2024-08-19 DIAGNOSIS — J45.30 MILD PERSISTENT ASTHMA WITHOUT COMPLICATION: Chronic | ICD-10-CM

## 2024-08-19 DIAGNOSIS — J30.9 ALLERGIC RHINITIS, UNSPECIFIED SEASONALITY, UNSPECIFIED TRIGGER: Chronic | ICD-10-CM

## 2024-08-19 DIAGNOSIS — Z00.00 PHYSICAL EXAM: Primary | ICD-10-CM

## 2024-08-19 DIAGNOSIS — E78.00 HYPERCHOLESTEREMIA: Chronic | ICD-10-CM

## 2024-08-19 PROCEDURE — 99396 PREV VISIT EST AGE 40-64: CPT | Performed by: NURSE PRACTITIONER

## 2024-08-19 NOTE — PROGRESS NOTES
Subjective   Rush Ruiz is a 43 y.o. male who is here for a physical exam.    History of Present Illness   History of Present Illness    He continues allergy injections which he is tolerating well, serum recently changed.  He uses Symbicort as needed, but not daily, and has used it a few times in the past week. He occasionally experiences pressure above his nose on the left side which he attributes to a recent tooth extraction.      He consulted Dr. Betancourt for rectal pain, which was suspected to be hemorrhoid-related. He was prescribed a stool softener and MiraLAX. He noticed a change in his bowel habits after the last holiday season, which did not resolve. Prior to this, he had two regular bowel movements daily. He also noticed a tender spot around his rectum, which did not cause pain during bowel movements but felt different to touch. The stool softener and MiraLAX helped regulate his bowel movements. He continues to use the stool softener but plans to reduce its use. The tender spot remains but is less sensitive.  He has started taking Metamucil fiber supplements at night. He admits to not drinking much water which may have contributed to change in bowel movements.     His low back pain has improved significantly with no recent flare-ups. He experienced minor discomfort after an episode of heavy work but recovered quickly. He reports no numbness or tingling in his legs. He occasionally experiences transient numbness in his fingers when sitting for extended periods but does not wake up with tingling hands at night. He maintains an active lifestyle, running and working out most days of the week      Past Medical History:   Diagnosis Date    Allergic rhinitis     Anxiety     Asthma     intermittent asthma without complications.    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 Milton Pineda         Current Outpatient Medications:     albuterol (PROVENTIL HFA;VENTOLIN HFA) 108 (90 BASE) MCG/ACT inhaler, Inhale 2 puffs Every 4 (Four) Hours As Needed for Wheezing., Disp: 1 inhaler, Rfl: 3    ALPRAZolam (Xanax) 0.25 MG tablet, Take 1 tablet by mouth 3 (Three) Times a Day As Needed for Anxiety., Disp: 45 tablet, Rfl: 0    Bilberry, Vaccinium myrtillus, 60 MG capsule, Take 280 mg by mouth Daily., Disp: , Rfl:     budesonide-formoterol (SYMBICORT) 80-4.5 MCG/ACT inhaler, Inhale 2 puffs by mouth twice daily, Disp: 11 g, Rfl: 0    docusate sodium (COLACE) 250 MG capsule, Take 1 capsule by mouth Daily., Disp: 120 capsule, Rfl: 0    MULTIPLE VITAMINS-MINERALS PO, Take 1 tablet by mouth Daily., Disp: , Rfl:     NON FORMULARY, Allergy injections every 4 weeks, Disp: , Rfl:     rosuvastatin (CRESTOR) 5 MG tablet, TAKE 1 TABLET BY MOUTH DAILY, Disp: 90 tablet, Rfl: 1    No Known Allergies    Review of Systems    Objective   Vitals:    08/19/24 0921   BP: 120/84   BP Location: Left arm   Patient Position: Sitting   Cuff Size: Adult   Pulse: 59   SpO2: 98%   Weight: 84.5 kg (186 lb 3.2 oz)   Height: 185.4 cm (73\")     Physical Exam  Constitutional:       General: He is not in acute distress.     Appearance: Normal appearance. He is not diaphoretic.   HENT:      Head: Normocephalic and atraumatic.      Right Ear: Tympanic membrane, ear canal and external ear normal.      Ears:      Comments: Atresia left ear     Nose: Nose normal. No rhinorrhea.      Mouth/Throat:      Mouth: Mucous membranes are moist.      Pharynx: Oropharynx is clear.   Eyes:      General:         Right eye: No discharge.         Left eye: No discharge.      Conjunctiva/sclera: Conjunctivae normal.   Cardiovascular:      Rate and Rhythm: Normal rate and regular rhythm.      Pulses: Normal pulses.      Heart sounds: Normal heart sounds.       with a grade of 2/6.   Pulmonary:      Effort: Pulmonary " effort is normal.      Breath sounds: Normal breath sounds.   Abdominal:      General: Bowel sounds are normal.      Tenderness: There is no abdominal tenderness.   Genitourinary:     Rectum: No tenderness or external hemorrhoid.   Musculoskeletal:         General: No swelling or tenderness.      Cervical back: Normal range of motion.   Skin:     General: Skin is warm and dry.   Neurological:      General: No focal deficit present.      Mental Status: He is alert and oriented to person, place, and time.   Psychiatric:         Mood and Affect: Mood normal.         Behavior: Behavior normal.         Judgment: Judgment normal.       Physical Exam      Results         Assessment & Plan   Diagnoses and all orders for this visit:    1. Physical exam (Primary)  -     CBC (No Diff)  -     Comprehensive Metabolic Panel  -     Lipid Panel  -     TSH    2. Allergic rhinitis, unspecified seasonality, unspecified trigger  Assessment & Plan:  He continues to receive allergy injections with good management of symptoms.      3. Hypercholesteremia  Assessment & Plan:  He denies myalgias with rosuvastatin which he will continue along with a low-fat, low-cholesterol diet.   Lab Results   Component Value Date    LDL 86 02/14/2024         4. Mild persistent asthma without complication  Assessment & Plan:  He has done well since stopping Singulair 1/2024, currently managed with Symbicort as needed        Assessment & Plan    Rectal pain.  He experienced rectal pain, which was previously evaluated by Dr. Betancourt and attributed to hemorrhoids. He was prescribed a stool softener and MiraLAX, which have improved his bowel movements. However, he reports a persistent tender spot and a small, non-tender bump in the rectal area. A rectal examination today revealed no abnormalities. He is advised to maintain a daily water intake of 64 ounces to help with bowel regularity. Blood work has been ordered for further evaluation.    [facility]  Maintenance.  He had a colonoscopy in September 2023 and is on a 5-year follow-up schedule. He is planning to receive his influenza vaccine in mid-October 2024.      Risk Assessment:  Family History   Problem Relation Age of Onset    No Known Problems Mother     Heart disease Father     Heart attack Father 38    Colon cancer Father 64    Gout Father     Colon polyps Father     Heart attack Paternal Uncle     Malig Hyperthermia Neg Hx      His Body mass index is 24.57 kg/m². He remains active and follows a low-fat, low-cholesterol diet.    Prevention:  Health Maintenance   Topic Date Due    Pneumococcal Vaccine 0-64 (2 of 2 - PCV) 01/13/2023    ANNUAL PHYSICAL  08/09/2024    INFLUENZA VACCINE  08/01/2024    LIPID PANEL  02/14/2025    TDAP/TD VACCINES (2 - Td or Tdap) 10/08/2025    COLORECTAL CANCER SCREENING  09/06/2028    HEPATITIS C SCREENING  Completed    COVID-19 Vaccine  Completed       Discussed healthy lifestyle choices such as maintaining a balanced diet low in carbohydrates and limiting caffeine and alcohol intake.  Recommended routine exercise for bone strength and cardiovascular health.         Patient or patient representative verbalized consent for the use of Ambient Listening during the visit with  WESLEY Macdonald for chart documentation. 8/19/2024  10:58 EDT

## 2024-08-19 NOTE — ASSESSMENT & PLAN NOTE
He denies myalgias with rosuvastatin which he will continue along with a low-fat, low-cholesterol diet.   Lab Results   Component Value Date    LDL 86 02/14/2024

## 2024-08-28 LAB
ALBUMIN SERPL-MCNC: 4.3 G/DL (ref 3.5–5.2)
ALBUMIN/GLOB SERPL: 1.9 G/DL
ALP SERPL-CCNC: 50 U/L (ref 39–117)
ALT SERPL-CCNC: 27 U/L (ref 1–41)
AST SERPL-CCNC: 23 U/L (ref 1–40)
BILIRUB SERPL-MCNC: 0.4 MG/DL (ref 0–1.2)
BUN SERPL-MCNC: 14 MG/DL (ref 6–20)
BUN/CREAT SERPL: 14.1 (ref 7–25)
CALCIUM SERPL-MCNC: 9.3 MG/DL (ref 8.6–10.5)
CHLORIDE SERPL-SCNC: 103 MMOL/L (ref 98–107)
CHOLEST SERPL-MCNC: 171 MG/DL (ref 0–200)
CO2 SERPL-SCNC: 27.3 MMOL/L (ref 22–29)
CREAT SERPL-MCNC: 0.99 MG/DL (ref 0.76–1.27)
EGFRCR SERPLBLD CKD-EPI 2021: 95.7 ML/MIN/1.73
ERYTHROCYTE [DISTWIDTH] IN BLOOD BY AUTOMATED COUNT: 12.4 % (ref 12.3–15.4)
GLOBULIN SER CALC-MCNC: 2.3 GM/DL
GLUCOSE SERPL-MCNC: 91 MG/DL (ref 65–99)
HCT VFR BLD AUTO: 44.1 % (ref 37.5–51)
HDLC SERPL-MCNC: 50 MG/DL (ref 40–60)
HGB BLD-MCNC: 14.8 G/DL (ref 13–17.7)
LDLC SERPL CALC-MCNC: 104 MG/DL (ref 0–100)
MCH RBC QN AUTO: 30.4 PG (ref 26.6–33)
MCHC RBC AUTO-ENTMCNC: 33.6 G/DL (ref 31.5–35.7)
MCV RBC AUTO: 90.6 FL (ref 79–97)
PLATELET # BLD AUTO: 203 10*3/MM3 (ref 140–450)
POTASSIUM SERPL-SCNC: 4.7 MMOL/L (ref 3.5–5.2)
PROT SERPL-MCNC: 6.6 G/DL (ref 6–8.5)
RBC # BLD AUTO: 4.87 10*6/MM3 (ref 4.14–5.8)
SODIUM SERPL-SCNC: 140 MMOL/L (ref 136–145)
TRIGL SERPL-MCNC: 90 MG/DL (ref 0–150)
TSH SERPL DL<=0.005 MIU/L-ACNC: 1.63 UIU/ML (ref 0.27–4.2)
VLDLC SERPL CALC-MCNC: 17 MG/DL (ref 5–40)
WBC # BLD AUTO: 5.83 10*3/MM3 (ref 3.4–10.8)

## 2024-10-02 DIAGNOSIS — E78.00 HYPERCHOLESTEREMIA: Chronic | ICD-10-CM

## 2024-10-02 RX ORDER — ROSUVASTATIN CALCIUM 5 MG/1
5 TABLET, COATED ORAL DAILY
Qty: 30 TABLET | Refills: 1 | Status: SHIPPED | OUTPATIENT
Start: 2024-10-02

## 2024-10-03 ENCOUNTER — TELEPHONE (OUTPATIENT)
Dept: INTERNAL MEDICINE | Facility: CLINIC | Age: 44
End: 2024-10-03
Payer: COMMERCIAL

## 2024-10-03 NOTE — TELEPHONE ENCOUNTER
Caller: Rush Ruiz    Relationship: Self    Best call back number: [phone]      What was the call regarding: PATIENT CALLING IN WANTED TO SEE WHAT PAYING OUT OF POCKET FOR THE FLU SHOT AND COVID SHOT WOULD COST?      PATIENT ALSO WANTED BELLA QUIROZ TO KNOW HIS BLOOD TYPE IS A POSITIVE      PLEASE GIVE CALL BACK         SURG

## 2024-12-02 DIAGNOSIS — E78.00 HYPERCHOLESTEREMIA: Chronic | ICD-10-CM

## 2024-12-03 RX ORDER — ROSUVASTATIN CALCIUM 5 MG/1
5 TABLET, COATED ORAL DAILY
Qty: 30 TABLET | Refills: 1 | Status: SHIPPED | OUTPATIENT
Start: 2024-12-03

## 2024-12-30 DIAGNOSIS — F41.9 ANXIETY: ICD-10-CM

## 2024-12-30 RX ORDER — ALPRAZOLAM 0.25 MG/1
0.25 TABLET ORAL NIGHTLY PRN
Qty: 30 TABLET | Refills: 0 | Status: SHIPPED | OUTPATIENT
Start: 2024-12-30

## 2024-12-30 NOTE — TELEPHONE ENCOUNTER
Caller: Rush Ruiz    Relationship: Self    Best call back number:     [phone] (Mobile)       Requested Prescriptions:   Requested Prescriptions     Pending Prescriptions Disp Refills    ALPRAZolam (Xanax) 0.25 MG tablet 45 tablet 0     Sig: Take 1 tablet by mouth 3 (Three) Times a Day As Needed for Anxiety.        Pharmacy where request should be sent: [facility] [id number] [address] [phone] [facility] [phone]      Last office visit with prescribing clinician: 8/19/2024   Last telemedicine visit with prescribing clinician: Visit date not found   Next office visit with prescribing clinician: 2/24/2025     Additional details provided by patient: COMPLETELY OUT OF MEDICATION     Does the patient have less than a 3 day supply:  [x] Yes  [] No    Would you like a call back once the refill request has been completed: [] Yes [x] No    If the office needs to give you a call back, can they leave a voicemail: [] Yes [x] No    Halina Marks Rep   12/30/24 09:57 EST

## 2025-01-22 ENCOUNTER — TELEPHONE (OUTPATIENT)
Dept: INTERNAL MEDICINE | Facility: CLINIC | Age: 45
End: 2025-01-22
Payer: COMMERCIAL

## 2025-01-22 NOTE — TELEPHONE ENCOUNTER
Caller: Rush Ruiz    Relationship: Self    Best call back number: [phone]    What form or medical record are you requesting: LETTER OF MEDICAL NECESSITY    Who is requesting this form or medical record from you: MASSAGE THERAPIST    How would you like to receive the form or medical records (pick-up, mail, fax): [employer]    Timeframe paperwork needed: AS SOON AS AVAILABLE    Additional notes: STATED THAT THEY ARE NEEDING TO GET ANOTHER LETTER TO KEEP IT UP TO DATE FOR THE NEW YEAR. PLEASE CALL AND ADVISE

## 2025-02-10 DIAGNOSIS — E78.00 HYPERCHOLESTEREMIA: Chronic | ICD-10-CM

## 2025-02-10 RX ORDER — ROSUVASTATIN CALCIUM 5 MG/1
5 TABLET, COATED ORAL DAILY
Qty: 30 TABLET | Refills: 1 | Status: SHIPPED | OUTPATIENT
Start: 2025-02-10

## 2025-05-06 ENCOUNTER — OFFICE VISIT (OUTPATIENT)
Dept: INTERNAL MEDICINE | Facility: CLINIC | Age: 45
End: 2025-05-06
Payer: COMMERCIAL

## 2025-05-06 ENCOUNTER — TELEPHONE (OUTPATIENT)
Dept: INTERNAL MEDICINE | Facility: CLINIC | Age: 45
End: 2025-05-06

## 2025-05-06 VITALS
OXYGEN SATURATION: 95 % | TEMPERATURE: 97.9 F | BODY MASS INDEX: 25.13 KG/M2 | HEART RATE: 54 BPM | SYSTOLIC BLOOD PRESSURE: 124 MMHG | HEIGHT: 73 IN | WEIGHT: 189.6 LBS | DIASTOLIC BLOOD PRESSURE: 84 MMHG

## 2025-05-06 DIAGNOSIS — H53.9 VISUAL DISTURBANCE: Primary | ICD-10-CM

## 2025-05-06 DIAGNOSIS — E78.00 HYPERCHOLESTEREMIA: Chronic | ICD-10-CM

## 2025-05-06 DIAGNOSIS — J30.9 ALLERGIC RHINITIS, UNSPECIFIED SEASONALITY, UNSPECIFIED TRIGGER: Chronic | ICD-10-CM

## 2025-05-06 DIAGNOSIS — J45.30 MILD PERSISTENT ASTHMA WITHOUT COMPLICATION: Chronic | ICD-10-CM

## 2025-05-06 LAB
ALBUMIN SERPL-MCNC: 4.5 G/DL (ref 3.5–5.2)
ALBUMIN/GLOB SERPL: 2 G/DL
ALP SERPL-CCNC: 60 U/L (ref 39–117)
ALT SERPL-CCNC: 27 U/L (ref 1–41)
AST SERPL-CCNC: 24 U/L (ref 1–40)
BILIRUB SERPL-MCNC: 0.4 MG/DL (ref 0–1.2)
BUN SERPL-MCNC: 13 MG/DL (ref 6–20)
BUN/CREAT SERPL: 13.7 (ref 7–25)
CALCIUM SERPL-MCNC: 9.5 MG/DL (ref 8.6–10.5)
CHLORIDE SERPL-SCNC: 104 MMOL/L (ref 98–107)
CHOLEST SERPL-MCNC: 153 MG/DL (ref 0–200)
CO2 SERPL-SCNC: 28.2 MMOL/L (ref 22–29)
CREAT SERPL-MCNC: 0.95 MG/DL (ref 0.76–1.27)
EGFRCR SERPLBLD CKD-EPI 2021: 100 ML/MIN/1.73
GLOBULIN SER CALC-MCNC: 2.3 GM/DL
GLUCOSE SERPL-MCNC: 89 MG/DL (ref 65–99)
HDLC SERPL-MCNC: 52 MG/DL (ref 40–60)
LDLC SERPL CALC-MCNC: 89 MG/DL (ref 0–100)
POTASSIUM SERPL-SCNC: 4.9 MMOL/L (ref 3.5–5.2)
PROT SERPL-MCNC: 6.8 G/DL (ref 6–8.5)
SODIUM SERPL-SCNC: 141 MMOL/L (ref 136–145)
TRIGL SERPL-MCNC: 56 MG/DL (ref 0–150)
VLDLC SERPL CALC-MCNC: 12 MG/DL (ref 5–40)

## 2025-05-06 PROCEDURE — 99214 OFFICE O/P EST MOD 30 MIN: CPT | Performed by: NURSE PRACTITIONER

## 2025-05-06 RX ORDER — ROSUVASTATIN CALCIUM 5 MG/1
5 TABLET, COATED ORAL DAILY
Qty: 30 TABLET | Refills: 1 | Status: SHIPPED | OUTPATIENT
Start: 2025-05-06

## 2025-05-06 NOTE — TELEPHONE ENCOUNTER
Caller: Rush Ruiz    Relationship to patient: Self    Best call back number:[phone]      Patient is needing: HE LEFT A WATER BOTTLE IN ROOM 31.  HE WILL BE BY LATER TO PICK IT UP.

## 2025-05-06 NOTE — PROGRESS NOTES
"Chief Complaint  Asthma (6 month follow up)  Subjective        Rush Ruiz presents to [facility]  History of Present Illness  History of Present Illness  The patient presents for evaluation of transient vision loss, allergies, and heart murmur.    He reports general well-being with no new health concerns. His chronic low back pain remains stable. He managed 2 respiratory infections this winter without urgent care, likely not influenza due to vaccination. No shortness of breath reported.     He attributes recent heartburn and indigestion to dietary changes from travel, though symptoms are inconsistent. Sleep quality has improved, with occasional early waking.     He experienced transient vision loss last week at work, likely due to migraine, characterized by a gray field and near-total blindness in the right eye. 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 The recent episode lasted 1-2 minutes, with vision returning to normal. No associated headache, dizziness, lightheadedness, numbness. Occasional isolated dizzy spells without vision loss.    Informed of a faint heart murmur since his 20s or early 30s.    Uses Symbicort as needed, typically 1 puff daily, increasing frequency during late summer allergies and before running. Receives effective allergy injections every 4 weeks.     Objective   Vital Signs:  /84 (BP Location: Left arm, Patient Position: Sitting, Cuff Size: Adult)   Pulse 54   Temp 97.9 °F (36.6 °C)   Ht 185.4 cm (73\")   Wt 86 kg (189 lb 9.6 oz)   SpO2 95%   BMI 25.01 kg/m²   Estimated body mass index is 25.01 kg/m² as calculated from the following:    Height as of this encounter: 185.4 cm (73\").    Weight as of this encounter: 86 kg (189 lb 9.6 oz).    BMI is >= 25 and <30. (Overweight) The following options were offered after discussion;: nutrition counseling/recommendations      Physical Exam  Constitutional:       Appearance: He is " well-developed. He is not ill-appearing.   HENT:      Head: Normocephalic.      Right Ear: Hearing, tympanic membrane and external ear normal.      Ears:      Comments: Atresia left ear     Nose: Nose normal. No nasal deformity, mucosal edema or rhinorrhea.      Right Sinus: No maxillary sinus tenderness or frontal sinus tenderness.      Left Sinus: No maxillary sinus tenderness or frontal sinus tenderness.      Mouth/Throat:      Dentition: Normal dentition.   Eyes:      General: Lids are normal.         Right eye: No discharge.         Left eye: No discharge.      Conjunctiva/sclera: Conjunctivae normal.      Right eye: No exudate.     Left eye: No exudate.  Neck:      Thyroid: No thyroid mass or thyromegaly.      Vascular: No carotid bruit.      Trachea: Trachea normal.   Cardiovascular:      Rate and Rhythm: Regular rhythm.      Pulses: Normal pulses.      Heart sounds: Murmur heard.      Systolic murmur is present with a grade of 2/6.   Pulmonary:      Effort: No respiratory distress.      Breath sounds: Normal breath sounds. No decreased breath sounds, wheezing, rhonchi or rales.   Abdominal:      General: Bowel sounds are normal.      Palpations: Abdomen is soft.      Tenderness: There is no abdominal tenderness.   Musculoskeletal:      Cervical back: Normal range of motion. No edema.   Lymphadenopathy:      Head:      Right side of head: No submental, submandibular, tonsillar, preauricular, posterior auricular or occipital adenopathy.      Left side of head: No submental, submandibular, tonsillar, preauricular, posterior auricular or occipital adenopathy.   Skin:     General: Skin is warm and dry.      Nails: There is no clubbing.   Neurological:      Mental Status: He is alert.   Psychiatric:         Behavior: Behavior is cooperative.            Result Review :  The following data was reviewed by: WESLEY Macdonald on 05/06/2025:  Common labs          8/28/2024    08:37 5/6/2025    10:17   Common Labs    Glucose 91  89    BUN 14  13    Creatinine 0.99  0.95    Sodium 140  141    Potassium 4.7  4.9    Chloride 103  104    Calcium 9.3  9.5    Albumin 4.3  4.5    Total Bilirubin 0.4  0.4    Alkaline Phosphatase 50  60    AST (SGOT) 23  24    ALT (SGPT) 27  27    WBC 5.83     Hemoglobin 14.8     Hematocrit 44.1     Platelets 203     Total Cholesterol 171  153    Triglycerides 90  56    HDL Cholesterol 50  52    LDL Cholesterol  104  89           Results               Assessment and Plan   Diagnoses and all orders for this visit:    1. 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 The recent episode lasted 1-2 minutes, with vision returning to normal. No associated headache, dizziness, lightheadedness, numbness, blurred vision, or double vision. Occasional isolated dizzy spells without vision loss. Ordered carotid ultrasound.    Orders:  -     Duplex Carotid Ultrasound CAR; Future    2. Mild persistent asthma without complication  Assessment & Plan:  Uses Symbicort intermittently, typically 1 puff daily, increasing frequency during late summer allergies and before running.          3. Hypercholesteremia  Assessment & Plan:  He is currently managed on rosuvastatin without myalgias, recheck lipid panel.    Orders:  -     Lipid Panel  -     Comprehensive Metabolic Panel    4. Allergic rhinitis, unspecified seasonality, unspecified trigger  Assessment & Plan:  Receives effective allergy injections every 4 weeks.        Assessment & Plan    Health maintenance.  Due for colonoscopy in 2028, current with other health screenings. Blood pressure well-controlled at 124/84. Ordered lab tests for cholesterol, kidney, and liver function.         Follow Up   Return in about 6 months (around 11/6/2025) for Annual  physical.  Patient was given instructions and counseling regarding his condition or for health maintenance advice. Please see specific information pulled into the AVS if appropriate.     Patient or patient representative verbalized consent for the use of Ambient Listening during the visit with  WESLEY Macdonald for chart documentation. 5/17/2025  07:50 EDT

## 2025-05-07 ENCOUNTER — RESULTS FOLLOW-UP (OUTPATIENT)
Dept: INTERNAL MEDICINE | Facility: CLINIC | Age: 45
End: 2025-05-07

## 2025-05-17 PROBLEM — H53.9 VISUAL DISTURBANCE: Status: ACTIVE | Noted: 2025-05-17

## 2025-05-17 PROBLEM — R01.1 HEART MURMUR: Status: ACTIVE | Noted: 2025-05-17

## 2025-05-17 NOTE — ASSESSMENT & PLAN NOTE
Uses Symbicort intermittently, typically 1 puff daily, increasing frequency during late summer allergies and before running.

## 2025-05-17 NOTE — ASSESSMENT & PLAN NOTE
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 The recent episode lasted 1-2 minutes, with vision returning to normal. No associated headache, dizziness, lightheadedness, numbness, blurred vision, or double vision. Occasional isolated dizzy spells without vision loss. Ordered carotid ultrasound.

## 2025-05-19 ENCOUNTER — TELEPHONE (OUTPATIENT)
Dept: INTERNAL MEDICINE | Facility: CLINIC | Age: 45
End: 2025-05-19
Payer: COMMERCIAL

## 2025-05-19 NOTE — TELEPHONE ENCOUNTER
Caller: Rush Ruiz    Relationship: Self    Best call back number:     [phone]       What is the best time to reach you: ANYTIME    Who are you requesting to speak with (clinical staff, provider,  specific staff member): CLINICAL    What was the call regarding: PATIENT CALLING WANTING TO CHECK TO SEE IF  THAT THE DUPLEX CAROTID ULTRASOUND WOULD BE THE ONLY OPTION AVAILABLE THAT WOULD BE COST EFFECTIVE. PATIENT STATES IF NOT THAT IS FINE HE WANTED TO DOUBLE CHECK.    Is it okay if the provider responds through MyChart: YES

## 2025-05-20 NOTE — TELEPHONE ENCOUNTER
Patient advised via voicemail oziel is out of office this week but we will return his call next week once she returns.

## 2025-05-23 NOTE — TELEPHONE ENCOUNTER
He may have the carotid doppler at [facility] as a screening which I think would be $30. He may have the carotid ultrasound only or the package for $154 (carotid doppler, screen for AAA and coronary calcium score). Please give him number to schedule and let me know which he would like, I don't think they will let him schedule without an order in the chart. Thanks.

## 2025-05-27 DIAGNOSIS — Z13.6 ENCOUNTER FOR SCREENING FOR VASCULAR DISEASE: ICD-10-CM

## 2025-05-27 DIAGNOSIS — Z13.6 SCREENING FOR CARDIOVASCULAR CONDITION: Primary | ICD-10-CM

## 2025-05-27 NOTE — TELEPHONE ENCOUNTER
"Attempted to call patient, line keeps stating \"call cannot be completed\" [facility]  "

## 2025-06-23 ENCOUNTER — HOSPITAL ENCOUNTER (OUTPATIENT)
Dept: CARDIOLOGY | Facility: HOSPITAL | Age: 45
Discharge: HOME | End: 2025-06-23
Admitting: NURSE PRACTITIONER
Payer: COMMERCIAL

## 2025-06-23 DIAGNOSIS — H53.9 VISUAL DISTURBANCE: ICD-10-CM

## 2025-06-23 LAB
BH CV XLRA MEAS LEFT CAROTID BULB EDV: -7.7 CM/SEC
BH CV XLRA MEAS LEFT CAROTID BULB PSV: -48.8 CM/SEC
BH CV XLRA MEAS LEFT DIST CCA EDV: -28.1 CM/SEC
BH CV XLRA MEAS LEFT DIST CCA PSV: -97.8 CM/SEC
BH CV XLRA MEAS LEFT DIST ICA EDV: -30.7 CM/SEC
BH CV XLRA MEAS LEFT DIST ICA PSV: -67 CM/SEC
BH CV XLRA MEAS LEFT ICA/CCA RATIO: 0.59
BH CV XLRA MEAS LEFT PROX CCA EDV: 34.9 CM/SEC
BH CV XLRA MEAS LEFT PROX CCA PSV: 121 CM/SEC
BH CV XLRA MEAS LEFT PROX ECA PSV: -88.1 CM/SEC
BH CV XLRA MEAS LEFT PROX ICA EDV: -29.6 CM/SEC
BH CV XLRA MEAS LEFT PROX ICA PSV: -70.8 CM/SEC
BH CV XLRA MEAS LEFT PROX SCLA PSV: 170 CM/SEC
BH CV XLRA MEAS LEFT VERTEBRAL A EDV: 13.7 CM/SEC
BH CV XLRA MEAS LEFT VERTEBRAL A PSV: 37.3 CM/SEC
BH CV XLRA MEAS RIGHT CAROTID BULB EDV: -5.5 CM/SEC
BH CV XLRA MEAS RIGHT CAROTID BULB PSV: -36.5 CM/SEC
BH CV XLRA MEAS RIGHT DIST CCA EDV: 19.3 CM/SEC
BH CV XLRA MEAS RIGHT DIST CCA PSV: 73.9 CM/SEC
BH CV XLRA MEAS RIGHT DIST ICA EDV: -25.6 CM/SEC
BH CV XLRA MEAS RIGHT DIST ICA PSV: -75.7 CM/SEC
BH CV XLRA MEAS RIGHT ICA/CCA RATIO: 0.83
BH CV XLRA MEAS RIGHT PROX CCA EDV: 23.6 CM/SEC
BH CV XLRA MEAS RIGHT PROX CCA PSV: 90.7 CM/SEC
BH CV XLRA MEAS RIGHT PROX ECA PSV: -85.7 CM/SEC
BH CV XLRA MEAS RIGHT PROX ICA EDV: -26.7 CM/SEC
BH CV XLRA MEAS RIGHT PROX ICA PSV: -67 CM/SEC
BH CV XLRA MEAS RIGHT PROX SCLA PSV: 150 CM/SEC
BH CV XLRA MEAS RIGHT VERTEBRAL A EDV: -15.7 CM/SEC
BH CV XLRA MEAS RIGHT VERTEBRAL A PSV: -43.7 CM/SEC
LEFT ARM BP: NORMAL MMHG
RIGHT ARM BP: NORMAL MMHG

## 2025-06-23 PROCEDURE — 93880 EXTRACRANIAL BILAT STUDY: CPT

## 2025-06-23 PROCEDURE — 93880 EXTRACRANIAL BILAT STUDY: CPT | Performed by: SURGERY

## 2025-07-13 DIAGNOSIS — E78.00 HYPERCHOLESTEREMIA: Chronic | ICD-10-CM

## 2025-07-14 RX ORDER — ROSUVASTATIN CALCIUM 5 MG/1
5 TABLET, COATED ORAL DAILY
Qty: 30 TABLET | Refills: 1 | Status: SHIPPED | OUTPATIENT
Start: 2025-07-14

## (undated) DEVICE — THE TORRENT IRRIGATION SCOPE CONNECTOR IS USED WITH THE TORRENT IRRIGATION TUBING TO PROVIDE IRRIGATION FLUIDS SUCH AS STERILE WATER DURING GASTROINTESTINAL ENDOSCOPIC PROCEDURES WHEN USED IN CONJUNCTION WITH AN IRRIGATION PUMP (OR ELECTROSURGICAL UNIT).: Brand: TORRENT

## (undated) DEVICE — SENSR O2 OXIMAX FNGR A/ 18IN NONSTR

## (undated) DEVICE — ADAPT CLN BIOGUARD AIR/H2O DISP

## (undated) DEVICE — LN SMPL CO2 SHTRM SD STREAM W/M LUER

## (undated) DEVICE — CANN O2 ETCO2 FITS ALL CONN CO2 SMPL A/ 7IN DISP LF

## (undated) DEVICE — Device: Brand: DEFENDO AIR/WATER/SUCTION AND BIOPSY VALVE

## (undated) DEVICE — CANN NASL CO2 TRULINK W/O2 A/

## (undated) DEVICE — TUBING, SUCTION, 1/4" X 10', STRAIGHT: Brand: MEDLINE

## (undated) DEVICE — TBG 02 CRUSH RESIST LF CLR 7FT

## (undated) DEVICE — KT ORCA ORCAPOD DISP STRL